# Patient Record
Sex: FEMALE | Race: WHITE | NOT HISPANIC OR LATINO | Employment: UNEMPLOYED | URBAN - NONMETROPOLITAN AREA
[De-identification: names, ages, dates, MRNs, and addresses within clinical notes are randomized per-mention and may not be internally consistent; named-entity substitution may affect disease eponyms.]

---

## 2020-10-12 ENCOUNTER — HOSPITAL ENCOUNTER (EMERGENCY)
Facility: HOSPITAL | Age: 80
Discharge: HOME | End: 2020-10-13
Attending: FAMILY MEDICINE | Admitting: FAMILY MEDICINE

## 2020-10-12 ENCOUNTER — APPOINTMENT (OUTPATIENT)
Dept: GENERAL RADIOLOGY | Facility: HOSPITAL | Age: 80
End: 2020-10-12

## 2020-10-12 DIAGNOSIS — N30.90 CYSTITIS: Primary | ICD-10-CM

## 2020-10-12 DIAGNOSIS — E83.42 HYPOMAGNESEMIA: ICD-10-CM

## 2020-10-12 LAB
ALBUMIN SERPL-MCNC: 4.26 G/DL (ref 3.5–5.2)
ALBUMIN/GLOB SERPL: 1.6 G/DL
ALP SERPL-CCNC: 92 U/L (ref 39–117)
ALT SERPL W P-5'-P-CCNC: 13 U/L (ref 1–33)
ANION GAP SERPL CALCULATED.3IONS-SCNC: 11.9 MMOL/L (ref 5–15)
AST SERPL-CCNC: 15 U/L (ref 1–32)
BACTERIA UR QL AUTO: ABNORMAL /HPF
BASOPHILS # BLD AUTO: 0.06 10*3/MM3 (ref 0–0.2)
BASOPHILS NFR BLD AUTO: 0.6 % (ref 0–1.5)
BILIRUB SERPL-MCNC: 0.3 MG/DL (ref 0–1.2)
BILIRUB UR QL STRIP: NEGATIVE
BUN SERPL-MCNC: 27 MG/DL (ref 8–23)
BUN/CREAT SERPL: 19.4 (ref 7–25)
CALCIUM SPEC-SCNC: 9.9 MG/DL (ref 8.6–10.5)
CHLORIDE SERPL-SCNC: 106 MMOL/L (ref 98–107)
CLARITY UR: CLEAR
CO2 SERPL-SCNC: 22.1 MMOL/L (ref 22–29)
COLOR UR: YELLOW
CREAT SERPL-MCNC: 1.39 MG/DL (ref 0.57–1)
CRP SERPL-MCNC: 0.09 MG/DL (ref 0–0.5)
D-LACTATE SERPL-SCNC: 1.3 MMOL/L (ref 0.5–2)
DEPRECATED RDW RBC AUTO: 43.4 FL (ref 37–54)
EOSINOPHIL # BLD AUTO: 0.08 10*3/MM3 (ref 0–0.4)
EOSINOPHIL NFR BLD AUTO: 0.8 % (ref 0.3–6.2)
ERYTHROCYTE [DISTWIDTH] IN BLOOD BY AUTOMATED COUNT: 14.1 % (ref 12.3–15.4)
GFR SERPL CREATININE-BSD FRML MDRD: 36 ML/MIN/1.73
GLOBULIN UR ELPH-MCNC: 2.7 GM/DL
GLUCOSE SERPL-MCNC: 276 MG/DL (ref 65–99)
GLUCOSE UR STRIP-MCNC: NEGATIVE MG/DL
HCT VFR BLD AUTO: 43.7 % (ref 34–46.6)
HGB BLD-MCNC: 14 G/DL (ref 12–15.9)
HGB UR QL STRIP.AUTO: NEGATIVE
HOLD SPECIMEN: NORMAL
HOLD SPECIMEN: NORMAL
HYALINE CASTS UR QL AUTO: ABNORMAL /LPF
IMM GRANULOCYTES # BLD AUTO: 0.03 10*3/MM3 (ref 0–0.05)
IMM GRANULOCYTES NFR BLD AUTO: 0.3 % (ref 0–0.5)
KETONES UR QL STRIP: NEGATIVE
LEUKOCYTE ESTERASE UR QL STRIP.AUTO: ABNORMAL
LYMPHOCYTES # BLD AUTO: 2.36 10*3/MM3 (ref 0.7–3.1)
LYMPHOCYTES NFR BLD AUTO: 24 % (ref 19.6–45.3)
MAGNESIUM SERPL-MCNC: 1.4 MG/DL (ref 1.6–2.4)
MCH RBC QN AUTO: 27.1 PG (ref 26.6–33)
MCHC RBC AUTO-ENTMCNC: 32 G/DL (ref 31.5–35.7)
MCV RBC AUTO: 84.5 FL (ref 79–97)
MONOCYTES # BLD AUTO: 0.93 10*3/MM3 (ref 0.1–0.9)
MONOCYTES NFR BLD AUTO: 9.5 % (ref 5–12)
NEUTROPHILS NFR BLD AUTO: 6.37 10*3/MM3 (ref 1.7–7)
NEUTROPHILS NFR BLD AUTO: 64.8 % (ref 42.7–76)
NITRITE UR QL STRIP: NEGATIVE
NRBC BLD AUTO-RTO: 0 /100 WBC (ref 0–0.2)
NT-PROBNP SERPL-MCNC: 3488 PG/ML (ref 0–1800)
PH UR STRIP.AUTO: 5.5 [PH] (ref 5–8)
PLATELET # BLD AUTO: 271 10*3/MM3 (ref 140–450)
PMV BLD AUTO: 10.8 FL (ref 6–12)
POTASSIUM SERPL-SCNC: 4.6 MMOL/L (ref 3.5–5.2)
PROT SERPL-MCNC: 7 G/DL (ref 6–8.5)
PROT UR QL STRIP: NEGATIVE
RBC # BLD AUTO: 5.17 10*6/MM3 (ref 3.77–5.28)
RBC # UR: ABNORMAL /HPF
REF LAB TEST METHOD: ABNORMAL
SODIUM SERPL-SCNC: 140 MMOL/L (ref 136–145)
SP GR UR STRIP: 1.02 (ref 1–1.03)
SQUAMOUS #/AREA URNS HPF: ABNORMAL /HPF
TROPONIN T SERPL-MCNC: <0.01 NG/ML (ref 0–0.03)
TROPONIN T SERPL-MCNC: <0.01 NG/ML (ref 0–0.03)
TSH SERPL DL<=0.05 MIU/L-ACNC: 2.02 UIU/ML (ref 0.27–4.2)
UROBILINOGEN UR QL STRIP: ABNORMAL
WBC # BLD AUTO: 9.83 10*3/MM3 (ref 3.4–10.8)
WBC UR QL AUTO: ABNORMAL /HPF
WHOLE BLOOD HOLD SPECIMEN: NORMAL
WHOLE BLOOD HOLD SPECIMEN: NORMAL

## 2020-10-12 PROCEDURE — 86140 C-REACTIVE PROTEIN: CPT | Performed by: FAMILY MEDICINE

## 2020-10-12 PROCEDURE — 71045 X-RAY EXAM CHEST 1 VIEW: CPT

## 2020-10-12 PROCEDURE — 80053 COMPREHEN METABOLIC PANEL: CPT | Performed by: PHYSICIAN ASSISTANT

## 2020-10-12 PROCEDURE — 87086 URINE CULTURE/COLONY COUNT: CPT | Performed by: FAMILY MEDICINE

## 2020-10-12 PROCEDURE — 96365 THER/PROPH/DIAG IV INF INIT: CPT

## 2020-10-12 PROCEDURE — 85025 COMPLETE CBC W/AUTO DIFF WBC: CPT | Performed by: PHYSICIAN ASSISTANT

## 2020-10-12 PROCEDURE — 84443 ASSAY THYROID STIM HORMONE: CPT | Performed by: FAMILY MEDICINE

## 2020-10-12 PROCEDURE — 99285 EMERGENCY DEPT VISIT HI MDM: CPT

## 2020-10-12 PROCEDURE — 83735 ASSAY OF MAGNESIUM: CPT | Performed by: FAMILY MEDICINE

## 2020-10-12 PROCEDURE — 83880 ASSAY OF NATRIURETIC PEPTIDE: CPT | Performed by: PHYSICIAN ASSISTANT

## 2020-10-12 PROCEDURE — 25010000002 MAGNESIUM SULFATE 2 GM/50ML SOLUTION: Performed by: FAMILY MEDICINE

## 2020-10-12 PROCEDURE — 96366 THER/PROPH/DIAG IV INF ADDON: CPT

## 2020-10-12 PROCEDURE — 96375 TX/PRO/DX INJ NEW DRUG ADDON: CPT

## 2020-10-12 PROCEDURE — 93010 ELECTROCARDIOGRAM REPORT: CPT | Performed by: INTERNAL MEDICINE

## 2020-10-12 PROCEDURE — 81001 URINALYSIS AUTO W/SCOPE: CPT | Performed by: PHYSICIAN ASSISTANT

## 2020-10-12 PROCEDURE — 83605 ASSAY OF LACTIC ACID: CPT | Performed by: FAMILY MEDICINE

## 2020-10-12 PROCEDURE — 84484 ASSAY OF TROPONIN QUANT: CPT | Performed by: PHYSICIAN ASSISTANT

## 2020-10-12 PROCEDURE — 36415 COLL VENOUS BLD VENIPUNCTURE: CPT

## 2020-10-12 PROCEDURE — 93005 ELECTROCARDIOGRAM TRACING: CPT | Performed by: PHYSICIAN ASSISTANT

## 2020-10-12 PROCEDURE — 71045 X-RAY EXAM CHEST 1 VIEW: CPT | Performed by: RADIOLOGY

## 2020-10-12 PROCEDURE — 87040 BLOOD CULTURE FOR BACTERIA: CPT | Performed by: FAMILY MEDICINE

## 2020-10-12 RX ORDER — ASPIRIN 325 MG
325 TABLET ORAL ONCE
Status: COMPLETED | OUTPATIENT
Start: 2020-10-12 | End: 2020-10-12

## 2020-10-12 RX ORDER — DILTIAZEM HYDROCHLORIDE 5 MG/ML
5 INJECTION INTRAVENOUS ONCE
Status: COMPLETED | OUTPATIENT
Start: 2020-10-12 | End: 2020-10-12

## 2020-10-12 RX ORDER — MAGNESIUM SULFATE HEPTAHYDRATE 40 MG/ML
2 INJECTION, SOLUTION INTRAVENOUS ONCE
Status: COMPLETED | OUTPATIENT
Start: 2020-10-12 | End: 2020-10-13

## 2020-10-12 RX ADMIN — MAGNESIUM SULFATE IN WATER 2 G: 40 INJECTION, SOLUTION INTRAVENOUS at 22:14

## 2020-10-12 RX ADMIN — DILTIAZEM HYDROCHLORIDE 5 MG: 5 INJECTION INTRAVENOUS at 19:47

## 2020-10-12 RX ADMIN — ASPIRIN 325 MG: 325 TABLET ORAL at 21:30

## 2020-10-12 NOTE — ED PROVIDER NOTES
"Subjective   Patient is a 89-year-old female presents emergency department complaining of generalized weakness, heart palpitations and shortness of breath that has been going on for the last 3 days.  The patient has a history of atrial fibrillation and is on anticoagulation for this.  She went to her PCPs office today with the symptoms and was found to be in A. fib with RVR to 130 bpm.  The patient has not had any fever or chills.  She has mentioned that when she does any kind of physical activity over the last few days that she starts \"pouring sweat \".  The patient has also had some on and off chest pain that she describes as aching.  She states that her PCP gave her an oral antiarrhythmic agent prior to her coming to the ER.  The patient states that she has not been around anyone with coronavirus to her knowledge.  She states that she had one episode of vomiting earlier today.  She also mentions that she has a history of congestive heart failure which usually gets worse when her heart rate goes up.  The patient denies any additional symptoms at this time          Review of Systems   Constitutional: Positive for activity change and fatigue. Negative for appetite change, chills, diaphoresis and fever.   HENT: Negative for congestion, postnasal drip, rhinorrhea, sinus pressure, sinus pain, sneezing and sore throat.    Respiratory: Positive for shortness of breath. Negative for apnea, cough, choking, chest tightness and wheezing.    Cardiovascular: Positive for chest pain, palpitations and leg swelling.   Gastrointestinal: Positive for nausea and vomiting. Negative for abdominal distention, abdominal pain, constipation, diarrhea and rectal pain.   Genitourinary: Negative for difficulty urinating.   Musculoskeletal: Negative for arthralgias and gait problem.   Neurological: Negative for dizziness and light-headedness.   Psychiatric/Behavioral: Negative for agitation and confusion.       No past medical history on " file.    Allergies   Allergen Reactions   • Amoxicillin GI Intolerance   • Hydroxychloroquine Sulfate GI Intolerance   • Lactulose GI Intolerance   • Nsaids GI Intolerance   • Augmentin [Amoxicillin-Pot Clavulanate] Rash   • Penicillins Rash   • Potassium Clavulanate [Clavulanic Acid] Rash       No past surgical history on file.    No family history on file.    Social History     Socioeconomic History   • Marital status:      Spouse name: Not on file   • Number of children: Not on file   • Years of education: Not on file   • Highest education level: Not on file           Objective   Physical Exam  Vitals signs and nursing note reviewed.   Constitutional:       General: She is not in acute distress.     Appearance: She is well-developed and normal weight. She is not ill-appearing, toxic-appearing or diaphoretic.   HENT:      Head: Normocephalic.   Neck:      Musculoskeletal: Normal range of motion and neck supple.      Thyroid: No thyromegaly.      Vascular: No hepatojugular reflux or JVD.      Trachea: No tracheal deviation.   Cardiovascular:      Rate and Rhythm: Tachycardia present. Rhythm irregular.  No extrasystoles are present.     Chest Wall: PMI is not displaced.      Heart sounds: Heart sounds not distant. No murmur. No systolic murmur. No friction rub. No gallop. No S3 or S4 sounds.    Pulmonary:      Effort: Pulmonary effort is normal. No tachypnea, accessory muscle usage or respiratory distress.      Breath sounds: No stridor. Examination of the right-middle field reveals rhonchi. Examination of the left-middle field reveals rhonchi. Examination of the right-lower field reveals rhonchi. Examination of the left-lower field reveals rhonchi. Rhonchi present.   Chest:      Chest wall: No mass, deformity, tenderness or crepitus.   Abdominal:      General: There is no abdominal bruit.      Palpations: Abdomen is soft. There is no fluid wave, hepatomegaly or splenomegaly.   Musculoskeletal:      Right  lower leg: Edema present.      Left lower leg: Edema present.   Lymphadenopathy:      Cervical: No cervical adenopathy.   Skin:     General: Skin is warm and dry.      Capillary Refill: Capillary refill takes less than 2 seconds.      Coloration: Skin is not cyanotic or pale.      Findings: No ecchymosis or erythema.   Neurological:      General: No focal deficit present.      Mental Status: She is alert.      Cranial Nerves: No cranial nerve deficit.      Motor: No weakness.   Psychiatric:         Mood and Affect: Mood normal. Mood is not anxious.         Behavior: Behavior normal.         Procedures           ED Course                                           MDM  Number of Diagnoses or Management Options  Cystitis: new and requires workup  Hypomagnesemia: new and requires workup     Amount and/or Complexity of Data Reviewed  Clinical lab tests: ordered and reviewed  Tests in the radiology section of CPT®: reviewed and ordered  Tests in the medicine section of CPT®: reviewed and ordered  Independent visualization of images, tracings, or specimens: yes    Risk of Complications, Morbidity, and/or Mortality  Presenting problems: high  Diagnostic procedures: moderate  Management options: moderate        Final diagnoses:   Cystitis   Hypomagnesemia            Rajani Cruz,   10/13/20 0117       Rajani Cruz,   10/13/20 0118

## 2020-10-12 NOTE — ED PROVIDER NOTES
81 y/o female pt presents to the ED with palpitations and CP that started today.  Patient states that it is worsened with standing up.  Patient was seen at PCP office today and had AFIB on EKG so was sent to the ED.  Patient has rapid HR.  Normal breath sounds.      Kinga Fair PA  10/12/20 1802

## 2020-10-13 VITALS
BODY MASS INDEX: 29.49 KG/M2 | SYSTOLIC BLOOD PRESSURE: 122 MMHG | HEIGHT: 65 IN | WEIGHT: 177 LBS | OXYGEN SATURATION: 95 % | TEMPERATURE: 98.4 F | RESPIRATION RATE: 18 BRPM | DIASTOLIC BLOOD PRESSURE: 78 MMHG | HEART RATE: 84 BPM

## 2020-10-13 RX ORDER — CEPHALEXIN 500 MG/1
500 CAPSULE ORAL 2 TIMES DAILY
Qty: 20 CAPSULE | Refills: 0 | Status: SHIPPED | OUTPATIENT
Start: 2020-10-13

## 2020-10-13 RX ORDER — MAGNESIUM OXIDE 400 MG/1
400 TABLET ORAL DAILY
Qty: 14 TABLET | Refills: 0 | Status: SHIPPED | OUTPATIENT
Start: 2020-10-13

## 2020-10-13 NOTE — ED NOTES
Provider at bedside at this time updating pt and daughter on results and plan to discharge home, all questions and concerns addressed, understanding verbalized.     Elena Contreras RN  10/13/20 0116

## 2020-10-13 NOTE — ED NOTES
Provider at bedside at this time updating pt and family on POC, all questions and concerns addressed, understanding verbalized.     Elena Contreras RN  10/13/20 0013

## 2020-10-15 LAB — BACTERIA SPEC AEROBE CULT: NORMAL

## 2020-10-17 LAB
BACTERIA SPEC AEROBE CULT: NORMAL
BACTERIA SPEC AEROBE CULT: NORMAL

## 2021-04-12 ENCOUNTER — TRANSCRIBE ORDERS (OUTPATIENT)
Dept: ADMINISTRATIVE | Facility: HOSPITAL | Age: 81
End: 2021-04-12

## 2021-04-12 DIAGNOSIS — R07.9 CHEST PAIN, UNSPECIFIED TYPE: Primary | ICD-10-CM

## 2021-05-13 ENCOUNTER — HOSPITAL ENCOUNTER (OUTPATIENT)
Dept: NUCLEAR MEDICINE | Facility: HOSPITAL | Age: 81
Discharge: HOME | End: 2021-05-13

## 2021-05-13 ENCOUNTER — HOSPITAL ENCOUNTER (OUTPATIENT)
Dept: CARDIOLOGY | Facility: HOSPITAL | Age: 81
Discharge: HOME | End: 2021-05-13

## 2021-05-13 DIAGNOSIS — R07.9 CHEST PAIN, UNSPECIFIED TYPE: ICD-10-CM

## 2021-05-13 LAB
BH CV ECHO MEAS - % IVS THICK: 6.2 %
BH CV ECHO MEAS - % LVPW THICK: 3.8 %
BH CV ECHO MEAS - ACS: 1.8 CM
BH CV ECHO MEAS - AI DEC SLOPE: 225 CM/SEC^2
BH CV ECHO MEAS - AI MAX PG: 48.7 MMHG
BH CV ECHO MEAS - AI MAX VEL: 349 CM/SEC
BH CV ECHO MEAS - AI P1/2T: 454.3 MSEC
BH CV ECHO MEAS - AO MAX PG: 8.4 MMHG
BH CV ECHO MEAS - AO MEAN PG: 4 MMHG
BH CV ECHO MEAS - AO ROOT AREA (BSA CORRECTED): 1.4
BH CV ECHO MEAS - AO ROOT AREA: 5.7 CM^2
BH CV ECHO MEAS - AO ROOT DIAM: 2.7 CM
BH CV ECHO MEAS - AO V2 MAX: 145 CM/SEC
BH CV ECHO MEAS - AO V2 MEAN: 97.6 CM/SEC
BH CV ECHO MEAS - AO V2 VTI: 33.9 CM
BH CV ECHO MEAS - BSA(HAYCOCK): 1.9 M^2
BH CV ECHO MEAS - BSA: 1.9 M^2
BH CV ECHO MEAS - BZI_BMI: 29.5 KILOGRAMS/M^2
BH CV ECHO MEAS - BZI_METRIC_HEIGHT: 165.1 CM
BH CV ECHO MEAS - BZI_METRIC_WEIGHT: 80.3 KG
BH CV ECHO MEAS - EDV(CUBED): 107.2 ML
BH CV ECHO MEAS - EDV(MOD-SP4): 117 ML
BH CV ECHO MEAS - EDV(TEICH): 104.9 ML
BH CV ECHO MEAS - EF(CUBED): 62.2 %
BH CV ECHO MEAS - EF(MOD-SP4): 62.6 %
BH CV ECHO MEAS - EF(TEICH): 53.7 %
BH CV ECHO MEAS - ESV(CUBED): 40.5 ML
BH CV ECHO MEAS - ESV(MOD-SP4): 43.7 ML
BH CV ECHO MEAS - ESV(TEICH): 48.6 ML
BH CV ECHO MEAS - FS: 27.7 %
BH CV ECHO MEAS - IVS/LVPW: 0.95
BH CV ECHO MEAS - IVSD: 1.1 CM
BH CV ECHO MEAS - IVSS: 1.2 CM
BH CV ECHO MEAS - LA DIMENSION: 3.1 CM
BH CV ECHO MEAS - LA/AO: 1.1
BH CV ECHO MEAS - LV DIASTOLIC VOL/BSA (35-75): 62.3 ML/M^2
BH CV ECHO MEAS - LV MASS(C)D: 203.6 GRAMS
BH CV ECHO MEAS - LV MASS(C)DI: 108.4 GRAMS/M^2
BH CV ECHO MEAS - LV MASS(C)S: 133.9 GRAMS
BH CV ECHO MEAS - LV MASS(C)SI: 71.3 GRAMS/M^2
BH CV ECHO MEAS - LV SYSTOLIC VOL/BSA (12-30): 23.3 ML/M^2
BH CV ECHO MEAS - LVIDD: 4.8 CM
BH CV ECHO MEAS - LVIDS: 3.4 CM
BH CV ECHO MEAS - LVLD AP4: 7.2 CM
BH CV ECHO MEAS - LVLS AP4: 5.7 CM
BH CV ECHO MEAS - LVOT AREA (M): 3.1 CM^2
BH CV ECHO MEAS - LVOT AREA: 3.1 CM^2
BH CV ECHO MEAS - LVOT DIAM: 2 CM
BH CV ECHO MEAS - LVPWD: 1.2 CM
BH CV ECHO MEAS - LVPWS: 1.2 CM
BH CV ECHO MEAS - MV A MAX VEL: 98.5 CM/SEC
BH CV ECHO MEAS - MV E MAX VEL: 75 CM/SEC
BH CV ECHO MEAS - MV E/A: 0.76
BH CV ECHO MEAS - PA ACC TIME: 0.12 SEC
BH CV ECHO MEAS - PA PR(ACCEL): 23.7 MMHG
BH CV ECHO MEAS - RAP SYSTOLE: 10 MMHG
BH CV ECHO MEAS - RVSP: 36.8 MMHG
BH CV ECHO MEAS - SI(AO): 103.4 ML/M^2
BH CV ECHO MEAS - SI(CUBED): 35.5 ML/M^2
BH CV ECHO MEAS - SI(MOD-SP4): 39 ML/M^2
BH CV ECHO MEAS - SI(TEICH): 30 ML/M^2
BH CV ECHO MEAS - SV(AO): 194.1 ML
BH CV ECHO MEAS - SV(CUBED): 66.6 ML
BH CV ECHO MEAS - SV(MOD-SP4): 73.3 ML
BH CV ECHO MEAS - SV(TEICH): 56.3 ML
BH CV ECHO MEAS - TR MAX VEL: 259 CM/SEC
MAXIMAL PREDICTED HEART RATE: 139 BPM
STRESS TARGET HR: 118 BPM

## 2021-05-13 PROCEDURE — 25010000002 REGADENOSON 0.4 MG/5ML SOLUTION: Performed by: INTERNAL MEDICINE

## 2021-05-13 PROCEDURE — 0 TECHNETIUM SESTAMIBI: Performed by: INTERNAL MEDICINE

## 2021-05-13 PROCEDURE — A9500 TC99M SESTAMIBI: HCPCS | Performed by: INTERNAL MEDICINE

## 2021-05-13 PROCEDURE — 93017 CV STRESS TEST TRACING ONLY: CPT

## 2021-05-13 PROCEDURE — 78452 HT MUSCLE IMAGE SPECT MULT: CPT

## 2021-05-13 PROCEDURE — 93306 TTE W/DOPPLER COMPLETE: CPT

## 2021-05-13 RX ORDER — POLYETHYLENE GLYCOL 3350
POWDER (GRAM) MISCELLANEOUS
COMMUNITY

## 2021-05-13 RX ORDER — PROMETHAZINE HYDROCHLORIDE 25 MG/1
25 TABLET ORAL EVERY 6 HOURS PRN
COMMUNITY

## 2021-05-13 RX ORDER — DULOXETIN HYDROCHLORIDE 60 MG/1
60 CAPSULE, DELAYED RELEASE ORAL DAILY
COMMUNITY

## 2021-05-13 RX ORDER — GLIPIZIDE 5 MG/1
5 TABLET ORAL
COMMUNITY

## 2021-05-13 RX ORDER — GENTAMICIN SULFATE 3 MG/ML
1 SOLUTION/ DROPS OPHTHALMIC EVERY 4 HOURS
COMMUNITY

## 2021-05-13 RX ORDER — AMLODIPINE BESYLATE 10 MG/1
10 TABLET ORAL DAILY
COMMUNITY

## 2021-05-13 RX ORDER — ROSUVASTATIN CALCIUM 10 MG/1
10 TABLET, COATED ORAL DAILY
COMMUNITY

## 2021-05-13 RX ORDER — TORSEMIDE 20 MG/1
20 TABLET ORAL DAILY
COMMUNITY

## 2021-05-13 RX ORDER — ASCORBIC ACID 500 MG
500 TABLET ORAL DAILY
COMMUNITY

## 2021-05-13 RX ORDER — CIPROFLOXACIN 250 MG/1
250 TABLET, FILM COATED ORAL 2 TIMES DAILY
COMMUNITY

## 2021-05-13 RX ORDER — MECLIZINE HCL 25MG 25 MG/1
25 TABLET, CHEWABLE ORAL 3 TIMES DAILY PRN
COMMUNITY

## 2021-05-13 RX ORDER — ALBUTEROL SULFATE 2.5 MG/3ML
2.5 SOLUTION RESPIRATORY (INHALATION) EVERY 4 HOURS PRN
COMMUNITY

## 2021-05-13 RX ORDER — POTASSIUM CHLORIDE 20 MEQ/1
20 TABLET, EXTENDED RELEASE ORAL 2 TIMES DAILY
COMMUNITY

## 2021-05-13 RX ORDER — PANTOPRAZOLE SODIUM 40 MG/1
40 TABLET, DELAYED RELEASE ORAL DAILY
COMMUNITY

## 2021-05-13 RX ORDER — HYDROXYZINE PAMOATE 25 MG/1
25 CAPSULE ORAL 3 TIMES DAILY PRN
COMMUNITY

## 2021-05-13 RX ORDER — LANOLIN ALCOHOL/MO/W.PET/CERES
1000 CREAM (GRAM) TOPICAL DAILY
COMMUNITY

## 2021-05-13 RX ORDER — TRIAMCINOLONE ACETONIDE 0.25 MG/G
CREAM TOPICAL 2 TIMES DAILY
COMMUNITY

## 2021-05-13 RX ORDER — TRAZODONE HYDROCHLORIDE 50 MG/1
50 TABLET ORAL NIGHTLY
COMMUNITY

## 2021-05-13 RX ORDER — SENNA PLUS 8.6 MG/1
1 TABLET ORAL DAILY
COMMUNITY

## 2021-05-13 RX ORDER — LOSARTAN POTASSIUM 50 MG/1
100 TABLET ORAL DAILY
COMMUNITY

## 2021-05-13 RX ORDER — MELATONIN
1000 DAILY
COMMUNITY

## 2021-05-13 RX ORDER — CARVEDILOL 25 MG/1
25 TABLET ORAL 2 TIMES DAILY WITH MEALS
COMMUNITY

## 2021-05-13 RX ADMIN — TECHNETIUM TC 99M SESTAMIBI 1 DOSE: 1 INJECTION INTRAVENOUS at 13:55

## 2021-05-13 RX ADMIN — REGADENOSON 0.4 MG: 0.08 INJECTION, SOLUTION INTRAVENOUS at 13:55

## 2021-05-13 RX ADMIN — TECHNETIUM TC 99M SESTAMIBI 1 DOSE: 1 INJECTION INTRAVENOUS at 12:43

## 2021-05-17 LAB
BH CV NUCLEAR PRIOR STUDY: 3
BH CV REST NUCLEAR ISOTOPE DOSE: 9.5 MCI
BH CV STRESS BP STAGE 1: NORMAL
BH CV STRESS BP STAGE 2: NORMAL
BH CV STRESS COMMENTS STAGE 1: NORMAL
BH CV STRESS COMMENTS STAGE 2: NORMAL
BH CV STRESS DOSE REGADENOSON STAGE 1: 0.4
BH CV STRESS DURATION MIN STAGE 1: 0
BH CV STRESS DURATION MIN STAGE 2: 4
BH CV STRESS DURATION SEC STAGE 1: 10
BH CV STRESS DURATION SEC STAGE 2: 0
BH CV STRESS HR STAGE 1: 58
BH CV STRESS HR STAGE 2: 62
BH CV STRESS NUCLEAR ISOTOPE DOSE: 28.9 MCI
BH CV STRESS PROTOCOL 1: NORMAL
BH CV STRESS RECOVERY BP: NORMAL MMHG
BH CV STRESS RECOVERY HR: 62 BPM
BH CV STRESS STAGE 1: 1
BH CV STRESS STAGE 2: 2
LV EF NUC BP: 46 %
MAXIMAL PREDICTED HEART RATE: 139 BPM
PERCENT MAX PREDICTED HR: 48.2 %
STRESS BASELINE BP: NORMAL MMHG
STRESS BASELINE HR: 61 BPM
STRESS PERCENT HR: 57 %
STRESS POST PEAK BP: NORMAL MMHG
STRESS POST PEAK HR: 67 BPM
STRESS TARGET HR: 118 BPM

## 2024-01-25 ENCOUNTER — OFFICE VISIT (OUTPATIENT)
Dept: INFECTIOUS DISEASES | Facility: CLINIC | Age: 84
End: 2024-01-25
Payer: MEDICARE

## 2024-01-25 VITALS
HEIGHT: 65 IN | OXYGEN SATURATION: 98 % | WEIGHT: 178.4 LBS | HEART RATE: 69 BPM | DIASTOLIC BLOOD PRESSURE: 62 MMHG | SYSTOLIC BLOOD PRESSURE: 130 MMHG | BODY MASS INDEX: 29.72 KG/M2

## 2024-01-25 DIAGNOSIS — R33.9 URINARY RETENTION: ICD-10-CM

## 2024-01-25 DIAGNOSIS — Z16.12 EXTENDED SPECTRUM BETA LACTAMASE (ESBL) RESISTANCE: Primary | ICD-10-CM

## 2024-01-25 DIAGNOSIS — N39.0 RECURRENT UTI: ICD-10-CM

## 2024-01-25 LAB
BACTERIA UR QL AUTO: ABNORMAL /HPF
BILIRUB UR QL STRIP: NEGATIVE
CLARITY UR: ABNORMAL
COLOR UR: YELLOW
GLUCOSE UR STRIP-MCNC: NEGATIVE MG/DL
HGB UR QL STRIP.AUTO: ABNORMAL
HYALINE CASTS UR QL AUTO: ABNORMAL /LPF
KETONES UR QL STRIP: NEGATIVE
LEUKOCYTE ESTERASE UR QL STRIP.AUTO: ABNORMAL
NITRITE UR QL STRIP: NEGATIVE
PH UR STRIP.AUTO: 6 [PH] (ref 5–8)
PROT UR QL STRIP: ABNORMAL
RBC # UR STRIP: ABNORMAL /HPF
REF LAB TEST METHOD: ABNORMAL
SP GR UR STRIP: 1.01 (ref 1–1.03)
SQUAMOUS #/AREA URNS HPF: ABNORMAL /HPF
UROBILINOGEN UR QL STRIP: ABNORMAL
WBC # UR STRIP: ABNORMAL /HPF

## 2024-01-25 PROCEDURE — 87088 URINE BACTERIA CULTURE: CPT | Performed by: INTERNAL MEDICINE

## 2024-01-25 PROCEDURE — 99203 OFFICE O/P NEW LOW 30 MIN: CPT | Performed by: INTERNAL MEDICINE

## 2024-01-25 PROCEDURE — 87186 SC STD MICRODIL/AGAR DIL: CPT | Performed by: INTERNAL MEDICINE

## 2024-01-25 PROCEDURE — 81001 URINALYSIS AUTO W/SCOPE: CPT | Performed by: INTERNAL MEDICINE

## 2024-01-25 PROCEDURE — 87086 URINE CULTURE/COLONY COUNT: CPT | Performed by: INTERNAL MEDICINE

## 2024-01-25 NOTE — PROGRESS NOTES
[facility]         Referring Provider: Christian Mckeon DO  [address] [place] [address]    Subjective      Chief Complaint  Initial Evaluation (Recurrent ESBL UTI)    History of Present Illness  Maggie Perez is a 83 y.o. female who presents today to [facility] for Initial Consultation  . Past medical history is significant for recurrent UTI with ESBL Klebsiella most recently treated with oral Cipro with no improvement. No fever, and no chills. Has seen urology and follows up with Dr. Xie.    Past Medical History:   Diagnosis Date    Asthma     CHF (congestive heart failure)     Coronary artery disease        Past Surgical History:   Procedure Laterality Date    PERIPHERAL ARTERIAL STENT GRAFT         Social History     Socioeconomic History    Marital status:    Tobacco Use    Smoking status: Never       Family History  family history is not on file.    Immunization History   Administered Date(s) Administered    COVID-19 (PFIZER) Purple Cap Monovalent 09/09/2021        Allergies  Allergies   Allergen Reactions    Amoxicillin GI Intolerance    Hydroxychloroquine Sulfate GI Intolerance    Lactulose GI Intolerance    Nsaids GI Intolerance    Augmentin [Amoxicillin-Pot Clavulanate] Rash    Penicillins Rash    Potassium Clavulanate [Clavulanic Acid] Rash       The medication list has been reviewed and updated.   Current Medications    Current Outpatient Medications:     albuterol (PROVENTIL) (2.5 MG/3ML) 0.083% nebulizer solution, Take 2.5 mg by nebulization Every 4 (Four) Hours As Needed for Wheezing., Disp: , Rfl:     amLODIPine (NORVASC) 10 MG tablet, Take 10 mg by mouth Daily., Disp: , Rfl:     apixaban (ELIQUIS) 2.5 MG tablet tablet, Take 2.5 mg by mouth 2 (Two) Times a Day., Disp: , Rfl:     ascorbic acid (VITAMIN C) 500 MG tablet, Take 500 mg by mouth Daily., Disp: , Rfl:     carvedilol (COREG) 25 MG tablet, Take 25 mg by mouth  2 (Two) Times a Day With Meals., Disp: , Rfl:     cephalexin (KEFLEX) 500 MG capsule, Take 1 capsule by mouth 2 (Two) Times a Day., Disp: 20 capsule, Rfl: 0    cholecalciferol (VITAMIN D3) 25 MCG (1000 UT) tablet, Take 1,000 Units by mouth Daily., Disp: , Rfl:     ciprofloxacin (CIPRO) 250 MG tablet, Take 250 mg by mouth 2 (Two) Times a Day., Disp: , Rfl:     Diclofenac Sodium (VOLTAREN) 1 % gel gel, Apply 4 g topically to the appropriate area as directed 4 (Four) Times a Day As Needed., Disp: , Rfl:     DULoxetine (CYMBALTA) 60 MG capsule, Take 60 mg by mouth Daily., Disp: , Rfl:     gentamicin (GARAMYCIN) 0.3 % ophthalmic solution, 1 drop Every 4 (Four) Hours., Disp: , Rfl:     glipizide (GLUCOTROL) 5 MG tablet, Take 5 mg by mouth 2 (Two) Times a Day Before Meals., Disp: , Rfl:     hydrOXYzine pamoate (VISTARIL) 25 MG capsule, Take 25 mg by mouth 3 (Three) Times a Day As Needed for Itching., Disp: , Rfl:     insulin lispro protamine-insulin lispro (humaLOG 50-50) (50-50) 100 UNIT/ML suspension injection, Inject  under the skin into the appropriate area as directed 2 (Two) Times a Day With Meals., Disp: , Rfl:     losartan (COZAAR) 50 MG tablet, Take 100 mg by mouth Daily., Disp: , Rfl:     magnesium oxide (MAG-OX) 400 MG tablet, Take 1 tablet by mouth Daily., Disp: 14 tablet, Rfl: 0    meclizine 25 MG chewable tablet chewable tablet, Chew 25 mg 3 (Three) Times a Day As Needed., Disp: , Rfl:     pantoprazole (PROTONIX) 40 MG EC tablet, Take 40 mg by mouth Daily., Disp: , Rfl:     Polyethylene Glycol 3350 powder, , Disp: , Rfl:     potassium chloride (K-DUR,KLOR-CON) 20 MEQ CR tablet, Take 20 mEq by mouth 2 (Two) Times a Day., Disp: , Rfl:     promethazine (PHENERGAN) 25 MG tablet, Take 25 mg by mouth Every 6 (Six) Hours As Needed for Nausea or Vomiting., Disp: , Rfl:     rosuvastatin (CRESTOR) 10 MG tablet, Take 10 mg by mouth Daily., Disp: , Rfl:     senna (senna) 8.6 MG tablet, Take 1 tablet by mouth Daily.,  "Disp: , Rfl:     torsemide (DEMADEX) 20 MG tablet, Take 20 mg by mouth Daily., Disp: , Rfl:     traZODone (DESYREL) 50 MG tablet, Take 50 mg by mouth Every Night., Disp: , Rfl:     triamcinolone (KENALOG) 0.025 % cream, Apply  topically to the appropriate area as directed 2 (Two) Times a Day., Disp: , Rfl:     vitamin B-12 (CYANOCOBALAMIN) 1000 MCG tablet, Take 1,000 mcg by mouth Daily., Disp: , Rfl:       Review of Systems    Review of Systems   Constitutional:  Negative for activity change, appetite change, chills, diaphoresis, fatigue and fever.   HENT:  Negative for congestion, dental problem, drooling, ear discharge, ear pain, facial swelling, postnasal drip, sinus pressure, sore throat and swollen glands.    Eyes:  Negative for blurred vision, double vision, pain, discharge and itching.   Respiratory:  Negative for apnea, cough, choking, chest tightness and shortness of breath.    Cardiovascular:  Negative for chest pain, palpitations and leg swelling.   Gastrointestinal:  Negative for abdominal distention, abdominal pain, diarrhea, nausea, rectal pain and vomiting.   Genitourinary:  Positive for dysuria and frequency. Negative for difficulty urinating, flank pain, hematuria, pelvic pain and pelvic pressure.   Neurological:  Negative for dizziness, tremors, seizures, syncope, speech difficulty and confusion.   Psychiatric/Behavioral:  Negative for agitation and behavioral problems.         Objective     Vital Signs:  /62 (BP Location: Right arm, Patient Position: Sitting, Cuff Size: Small Adult)   Pulse 69   Ht 165.1 cm (65\")   Wt 80.9 kg (178 lb 6.4 oz)   SpO2 98%   BMI 29.69 kg/m²   Estimated body mass index is 29.69 kg/m² as calculated from the following:    Height as of this encounter: 165.1 cm (65\").    Weight as of this encounter: 80.9 kg (178 lb 6.4 oz).    Physical Exam  Constitutional:       General: She is not in acute distress.     Appearance: Normal appearance. She is not ill-appearing, " "toxic-appearing or diaphoretic.   HENT:      Head: Normocephalic and atraumatic.      Nose: No congestion or rhinorrhea.      Mouth/Throat:      Pharynx: Oropharynx is clear. No oropharyngeal exudate or posterior oropharyngeal erythema.   Cardiovascular:      Rate and Rhythm: Normal rate and regular rhythm.      Heart sounds: No murmur heard.  Pulmonary:      Effort: No respiratory distress.      Breath sounds: No stridor. No wheezing, rhonchi or rales.   Chest:      Chest wall: No tenderness.   Abdominal:      General: There is no distension.      Tenderness: There is no abdominal tenderness. There is no right CVA tenderness, left CVA tenderness, guarding or rebound.   Musculoskeletal:         General: No swelling, tenderness or signs of injury.      Cervical back: No rigidity or tenderness.   Skin:     Coloration: Skin is not jaundiced or pale.      Findings: No bruising, erythema or rash.   Neurological:      General: No focal deficit present.      Mental Status: She is alert. Mental status is at baseline.   Psychiatric:         Mood and Affect: Mood normal.         Behavior: Behavior normal.          Result Review :  The following data was reviewed by Kirstin Katz MD     Lab Results  Lab Results   Component Value Date    WBC 9.83 10/12/2020    HGB 14.0 10/12/2020    HCT 43.7 10/12/2020    MCV 84.5 10/12/2020     10/12/2020     Lab Results   Component Value Date    GLUCOSE 276 (H) 10/12/2020    BUN 27 (H) 10/12/2020    CREATININE 1.39 (H) 10/12/2020    EGFRIFNONA 36 (L) 10/12/2020    BCR 19.4 10/12/2020    K 4.6 10/12/2020    CO2 22.1 10/12/2020    CALCIUM 9.9 10/12/2020    ALBUMIN 4.26 10/12/2020    AST 15 10/12/2020    ALT 13 10/12/2020      Lab Results   Component Value Date    CRP 0.09 10/12/2020        No results found for: \"ACANTHNAEG\", \"AFBCX\", \"BPERTUSSISCX\", \"BLOODCX\"  No results found for: \"BCIDPCR\", \"CXREFLEX\", \"CSFCX\", \"CULTURETIS\"  No results found for: \"CULTURES\", \"HSVCX\", \"URCX\"  No " "results found for: \"EYECULTURE\", \"GCCX\", \"HSVCULTURE\", \"LABHSV\"  No results found for: \"LEGIONELLA\", \"MRSACX\", \"MUMPSCX\", \"MYCOPLASCX\"  No results found for: \"NOCARDIACX\", \"STOOLCX\"  No results found for: \"THROATCX\", \"UNSTIMCULT\", \"URINECX\", \"CULTURE\", \"VZVCULTUR\"  No results found for: \"VIRALCULTU\", \"WOUNDCX\"    Radiology Results              Assessment / Plan        Diagnoses and all orders for this visit:    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 Recurrent UTI  -     Urinalysis With Microscopic - Urine, Clean Catch; Future  -     Urine Culture - Urine, Urine, Catheter In/Out; Future    3. Urinary retention  -     Urinalysis With Microscopic - Urine, Clean Catch; Future  -     Urine Culture - Urine, Urine, Catheter In/Out; Future      I believe her problem with recurrent UTIs is rising from her urinary retention and the need for straight cath.    Will go ahead and check UA and urine culture today from in and out catheter and consider long term suppressive therapy.          Follow Up   Return in about 1 week (around 2/1/2024) for Follow up, With Dr. Katz.    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 Recurrent UTI  N39.0 599.0   3. Urinary retention  R33.9 788.20       Patient was given instructions and counseling regarding her condition or for health maintenance advice. Please see specific information pulled into the AVS if appropriate.     This document has been electronically signed by Kirstin Katz MD   January 25, 2024 09:14 EST    Dictated Utilizing Dragon Dictation: Part of this note may be an electronic transcription/translation of spoken language to printed text using the Dragon Dictation System.    "

## 2024-01-26 RX ORDER — SEMAGLUTIDE 1.34 MG/ML
INJECTION, SOLUTION SUBCUTANEOUS WEEKLY
COMMUNITY

## 2024-01-26 RX ORDER — TAMSULOSIN HYDROCHLORIDE 0.4 MG/1
1 CAPSULE ORAL DAILY
COMMUNITY

## 2024-01-26 RX ORDER — NITROGLYCERIN 80 MG/1
1 PATCH TRANSDERMAL DAILY
COMMUNITY

## 2024-01-26 RX ORDER — KETOCONAZOLE 20 MG/G
1 CREAM TOPICAL DAILY
COMMUNITY

## 2024-01-26 RX ORDER — INSULIN GLARGINE 100 [IU]/ML
INJECTION, SOLUTION SUBCUTANEOUS
COMMUNITY

## 2024-01-26 RX ORDER — CETIRIZINE HYDROCHLORIDE 10 MG/1
10 TABLET ORAL DAILY
COMMUNITY

## 2024-01-26 RX ORDER — ACETAMINOPHEN 500 MG
500 TABLET ORAL EVERY 6 HOURS PRN
COMMUNITY

## 2024-01-27 LAB — BACTERIA SPEC AEROBE CULT: ABNORMAL

## 2024-02-01 ENCOUNTER — OFFICE VISIT (OUTPATIENT)
Dept: INFECTIOUS DISEASES | Facility: CLINIC | Age: 84
End: 2024-02-01
Payer: MEDICARE

## 2024-02-01 VITALS
WEIGHT: 178.4 LBS | DIASTOLIC BLOOD PRESSURE: 69 MMHG | HEIGHT: 65 IN | SYSTOLIC BLOOD PRESSURE: 141 MMHG | HEART RATE: 72 BPM | OXYGEN SATURATION: 98 % | BODY MASS INDEX: 29.72 KG/M2

## 2024-02-01 DIAGNOSIS — I87.2 VENOUS STASIS DERMATITIS OF BOTH LOWER EXTREMITIES: ICD-10-CM

## 2024-02-01 DIAGNOSIS — Z16.12 EXTENDED SPECTRUM BETA LACTAMASE (ESBL) RESISTANCE: Primary | ICD-10-CM

## 2024-02-01 DIAGNOSIS — N39.0 RECURRENT UTI: ICD-10-CM

## 2024-02-01 DIAGNOSIS — R33.9 URINARY RETENTION: ICD-10-CM

## 2024-02-01 RX ORDER — CEPHALEXIN 500 MG/1
500 CAPSULE ORAL 3 TIMES DAILY
Qty: 42 CAPSULE | Refills: 0 | Status: SHIPPED | OUTPATIENT
Start: 2024-02-01 | End: 2024-02-15

## 2024-02-01 NOTE — PROGRESS NOTES
[facility]         Referring Provider: Christian Mckeon DO  [address] [facility] [place] [address]    Subjective      Chief Complaint  Follow-up (Extended spectrum beta lactamose ESBL resistance)    History of Present Illness  Maggie Perez is a 83 y.o. female who presents today to [facility]FECT[facility] for Follow Up . Past medical history is significant for recurrent UTI with ESBL Klebsiella most recently treated with oral Cipro with no improvement. No fever, and no chills. Has seen urology and follows up with Dr. Xie.     Past Medical History:   Diagnosis Date    Asthma     CHF (congestive heart failure)     Coronary artery disease        Past Surgical History:   Procedure Laterality Date    PERIPHERAL ARTERIAL STENT GRAFT         Social History     Socioeconomic History    Marital status:    Tobacco Use    Smoking status: Never       Family History  family history is not on file.    Immunization History   Administered Date(s) Administered    COVID-19 (PFIZER) Purple Cap Monovalent 09/09/2021        Allergies  Allergies   Allergen Reactions    Amoxicillin GI Intolerance    Hydroxychloroquine Sulfate GI Intolerance    Lactulose GI Intolerance    Nsaids GI Intolerance    Augmentin [Amoxicillin-Pot Clavulanate] Rash    Penicillins Rash    Potassium Clavulanate [Clavulanic Acid] Rash       The medication list has been reviewed and updated.   Current Medications    Current Outpatient Medications:     acetaminophen (TYLENOL) 500 MG tablet, Take 1 tablet by mouth Every 6 (Six) Hours As Needed for Mild Pain., Disp: , Rfl:     albuterol (PROVENTIL) (2.5 MG/3ML) 0.083% nebulizer solution, Take 2.5 mg by nebulization Every 4 (Four) Hours As Needed for Wheezing., Disp: , Rfl:     amLODIPine (NORVASC) 10 MG tablet, Take 1 tablet by mouth Daily., Disp: , Rfl:     apixaban (ELIQUIS) 2.5 MG tablet tablet, Take 1 tablet by mouth 2 (Two) Times a Day., Disp: , Rfl:      ascorbic acid (VITAMIN C) 500 MG tablet, Take 500 mg by mouth Daily., Disp: , Rfl:     carvedilol (COREG) 25 MG tablet, Take 1 tablet by mouth 2 (Two) Times a Day With Meals., Disp: , Rfl:     cephalexin (KEFLEX) 500 MG capsule, Take 1 capsule by mouth 3 (Three) Times a Day for 14 days., Disp: 42 capsule, Rfl: 0    cetirizine (zyrTEC) 10 MG tablet, Take 1 tablet by mouth Daily., Disp: , Rfl:     cholecalciferol (VITAMIN D3) 25 MCG (1000 UT) tablet, Take 1 tablet by mouth Daily., Disp: , Rfl:     ciprofloxacin (CIPRO) 250 MG tablet, Take 1 tablet by mouth 2 (Two) Times a Day., Disp: , Rfl:     Diclofenac Sodium (VOLTAREN) 1 % gel gel, Apply 4 g topically to the appropriate area as directed 4 (Four) Times a Day As Needed., Disp: , Rfl:     DULoxetine (CYMBALTA) 60 MG capsule, Take 1 capsule by mouth Daily., Disp: , Rfl:     estradiol (CLIMARA) 0.1 MG/24HR patch, Place 1 patch on the skin as directed by provider 1 (One) Time Per Week., Disp: , Rfl:     gentamicin (GARAMYCIN) 0.3 % ophthalmic solution, 1 drop Every 4 (Four) Hours., Disp: , Rfl:     glipizide (GLUCOTROL) 5 MG tablet, Take 1 tablet by mouth 2 (Two) Times a Day Before Meals., Disp: , Rfl:     hydrOXYzine pamoate (VISTARIL) 25 MG capsule, Take 1 capsule by mouth 3 (Three) Times a Day As Needed for Itching., Disp: , Rfl:     Insulin Glargine w/ Trans Port (Basaglar Tempo Pen) 100 UNIT/ML solution pen-injector, Inject  under the skin into the appropriate area as directed., Disp: , Rfl:     insulin lispro protamine-insulin lispro (humaLOG 50-50) (50-50) 100 UNIT/ML suspension injection, Inject  under the skin into the appropriate area as directed 2 (Two) Times a Day With Meals., Disp: , Rfl:     ketoconazole (NIZORAL) 2 % cream, Apply 1 application  topically to the appropriate area as directed Daily., Disp: , Rfl:     losartan (COZAAR) 50 MG tablet, Take 2 tablets by mouth Daily., Disp: , Rfl:     magnesium oxide (MAG-OX) 400 MG tablet, Take 1 tablet by mouth  Daily., Disp: 14 tablet, Rfl: 0    meclizine 25 MG chewable tablet chewable tablet, Chew 0.5 tablets 3 (Three) Times a Day As Needed., Disp: , Rfl:     nitroglycerin (NITRODUR) 0.4 MG/HR patch, Place 1 patch on the skin as directed by provider Daily., Disp: , Rfl:     pantoprazole (PROTONIX) 40 MG EC tablet, Take 1 tablet by mouth Daily., Disp: , Rfl:     Polyethylene Glycol 3350 powder, , Disp: , Rfl:     potassium chloride (K-DUR,KLOR-CON) 20 MEQ CR tablet, Take 1 tablet by mouth 2 (Two) Times a Day., Disp: , Rfl:     promethazine (PHENERGAN) 25 MG tablet, Take 1 tablet by mouth Every 6 (Six) Hours As Needed for Nausea or Vomiting., Disp: , Rfl:     rosuvastatin (CRESTOR) 10 MG tablet, Take 1 tablet by mouth Daily., Disp: , Rfl:     Semaglutide,0.25 or 0.5MG/DOS, (Ozempic, 0.25 or 0.5 MG/DOSE,) 2 MG/1.5ML solution pen-injector, Inject  under the skin into the appropriate area as directed 1 (One) Time Per Week., Disp: , Rfl:     senna (senna) 8.6 MG tablet, Take 1 tablet by mouth Daily., Disp: , Rfl:     tamsulosin (FLOMAX) 0.4 MG capsule 24 hr capsule, Take 1 capsule by mouth Daily., Disp: , Rfl:     torsemide (DEMADEX) 20 MG tablet, Take 1 tablet by mouth Daily., Disp: , Rfl:     traZODone (DESYREL) 50 MG tablet, Take 1 tablet by mouth Every Night., Disp: , Rfl:     triamcinolone (KENALOG) 0.025 % cream, Apply  topically to the appropriate area as directed 2 (Two) Times a Day., Disp: , Rfl:     vitamin B-12 (CYANOCOBALAMIN) 1000 MCG tablet, Take 1 tablet by mouth Daily., Disp: , Rfl:       Review of Systems    Review of Systems   Constitutional:  Negative for activity change, appetite change, chills, diaphoresis, fatigue and fever.   HENT:  Negative for congestion, dental problem, drooling, ear discharge, ear pain, facial swelling, postnasal drip, sinus pressure, sore throat and swollen glands.    Eyes:  Negative for blurred vision, double vision, pain, discharge and itching.   Respiratory:  Negative for apnea,  "cough, choking, chest tightness and shortness of breath.    Cardiovascular:  Negative for chest pain, palpitations and leg swelling.   Gastrointestinal:  Negative for abdominal distention, abdominal pain, diarrhea, nausea, rectal pain and vomiting.   Genitourinary:  Positive for difficulty urinating and dysuria. Negative for flank pain, frequency, hematuria, pelvic pain and pelvic pressure.   Neurological:  Negative for dizziness, tremors, seizures, syncope, speech difficulty and confusion.   Psychiatric/Behavioral:  Negative for agitation and behavioral problems.         Objective     Vital Signs:  /69 (BP Location: Right arm, Patient Position: Sitting, Cuff Size: Small Adult)   Pulse 72   Ht 165.1 cm (65\")   Wt 80.9 kg (178 lb 6.4 oz)   SpO2 98%   BMI 29.69 kg/m²   Estimated body mass index is 29.69 kg/m² as calculated from the following:    Height as of this encounter: 165.1 cm (65\").    Weight as of this encounter: 80.9 kg (178 lb 6.4 oz).    Physical Exam  Constitutional:       General: She is not in acute distress.     Appearance: Normal appearance. She is not ill-appearing, toxic-appearing or diaphoretic.   HENT:      Head: Normocephalic and atraumatic.      Nose: No congestion or rhinorrhea.      Mouth/Throat:      Pharynx: Oropharynx is clear. No oropharyngeal exudate or posterior oropharyngeal erythema.   Cardiovascular:      Rate and Rhythm: Normal rate and regular rhythm.      Heart sounds: No murmur heard.  Pulmonary:      Effort: No respiratory distress.      Breath sounds: No stridor. No wheezing, rhonchi or rales.   Chest:      Chest wall: No tenderness.   Abdominal:      General: There is no distension.      Tenderness: There is no abdominal tenderness. There is no right CVA tenderness, left CVA tenderness, guarding or rebound.   Musculoskeletal:         General: Swelling present. No tenderness or signs of injury.      Cervical back: No rigidity or tenderness.      Comments: Bilateral " "lower extremities with erythema but no drainage, no warmth   Skin:     Coloration: Skin is not jaundiced or pale.      Findings: No bruising, erythema or rash.   Neurological:      General: No focal deficit present.      Mental Status: She is alert. Mental status is at baseline.   Psychiatric:         Mood and Affect: Mood normal.         Behavior: Behavior normal.          Result Review :  The following data was reviewed by Kirstin Katz MD     Lab Results  Lab Results   Component Value Date    WBC 9.83 10/12/2020    HGB 14.0 10/12/2020    HCT 43.7 10/12/2020    MCV 84.5 10/12/2020     10/12/2020     Lab Results   Component Value Date    GLUCOSE 276 (H) 10/12/2020    BUN 27 (H) 10/12/2020    CREATININE 1.39 (H) 10/12/2020    EGFRIFNONA 36 (L) 10/12/2020    BCR 19.4 10/12/2020    K 4.6 10/12/2020    CO2 22.1 10/12/2020    CALCIUM 9.9 10/12/2020    ALBUMIN 4.26 10/12/2020    AST 15 10/12/2020    ALT 13 10/12/2020      Lab Results   Component Value Date    CRP 0.09 10/12/2020        No results found for: \"ACANTHNAEG\", \"AFBCX\", \"BPERTUSSISCX\", \"BLOODCX\"  No results found for: \"BCIDPCR\", \"CXREFLEX\", \"CSFCX\", \"CULTURETIS\"  No results found for: \"CULTURES\", \"HSVCX\", \"URCX\"  No results found for: \"EYECULTURE\", \"GCCX\", \"HSVCULTURE\", \"LABHSV\"  No results found for: \"LEGIONELLA\", \"MRSACX\", \"MUMPSCX\", \"MYCOPLASCX\"  No results found for: \"NOCARDIACX\", \"STOOLCX\"  Urine Culture   Date Value Ref Range Status   01/25/2024 >100,000 CFU/mL Escherichia coli (A)  Final     No results found for: \"VIRALCULTU\", \"WOUNDCX\"    Radiology Results              Assessment / Plan        Diagnoses and all orders for this visit:    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 Recurrent UTI    3. Urinary retention    4. Venous stasis dermatitis of both lower extremities    Other orders  -     cephalexin (KEFLEX) 500 MG capsule; Take 1 capsule by mouth 3 (Three) Times a Day for 14 days.  Dispense: 42 capsule; " Refill: 0      I believe her problem with recurrent UTIs is rising from her urinary retention and the need for straight cath.     I checked UA and urine culture last visit from in and out catheter and consider long term suppressive therapy.    Culture showed growth of E. Coli resistant only to Levofloxacin and Bactrim.    She is not self-cathing as often as she is supposed to three times a day.    Has new onset erythema to the lower extremity with history of venous stasis cellulitis in the past.    Will need to do suppressive therapy after her 2 weeks for Cephalexin.      Follow Up   Return in about 2 weeks (around 2/15/2024) for Follow up, With Dr. Katz.    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 Recurrent UTI  N39.0 599.0   3. Urinary retention  R33.9 788.20   4. Venous stasis dermatitis of both lower extremities  I87.2 454.1       Patient was given instructions and counseling regarding her condition or for health maintenance advice. Please see specific information pulled into the AVS if appropriate.     This document has been electronically signed by Kirstin Katz MD   February 1, 2024 09:10 EST    Dictated Utilizing Dragon Dictation: Part of this note may be an electronic transcription/translation of spoken language to printed text using the Dragon Dictation System.

## 2024-02-15 ENCOUNTER — OFFICE VISIT (OUTPATIENT)
Dept: INFECTIOUS DISEASES | Facility: CLINIC | Age: 84
End: 2024-02-15
Payer: MEDICARE

## 2024-02-15 VITALS
OXYGEN SATURATION: 98 % | WEIGHT: 183 LBS | HEART RATE: 86 BPM | DIASTOLIC BLOOD PRESSURE: 70 MMHG | HEIGHT: 65 IN | BODY MASS INDEX: 30.49 KG/M2 | TEMPERATURE: 96.3 F | SYSTOLIC BLOOD PRESSURE: 156 MMHG

## 2024-02-15 DIAGNOSIS — Z16.12 EXTENDED SPECTRUM BETA LACTAMASE (ESBL) RESISTANCE: Primary | ICD-10-CM

## 2024-02-15 DIAGNOSIS — N39.0 RECURRENT UTI: ICD-10-CM

## 2024-02-15 DIAGNOSIS — R33.9 URINARY RETENTION: ICD-10-CM

## 2024-02-15 DIAGNOSIS — I87.2 VENOUS STASIS DERMATITIS OF BOTH LOWER EXTREMITIES: ICD-10-CM

## 2024-02-15 RX ORDER — CEPHALEXIN 500 MG/1
500 CAPSULE ORAL EVERY 12 HOURS
Qty: 180 CAPSULE | Refills: 0 | Status: SHIPPED | OUTPATIENT
Start: 2024-02-15 | End: 2024-05-15

## 2024-02-29 ENCOUNTER — OFFICE VISIT (OUTPATIENT)
Dept: INFECTIOUS DISEASES | Facility: CLINIC | Age: 84
End: 2024-02-29
Payer: MEDICARE

## 2024-02-29 VITALS
OXYGEN SATURATION: 97 % | HEIGHT: 65 IN | BODY MASS INDEX: 29.96 KG/M2 | WEIGHT: 179.8 LBS | SYSTOLIC BLOOD PRESSURE: 128 MMHG | DIASTOLIC BLOOD PRESSURE: 70 MMHG | HEART RATE: 69 BPM

## 2024-02-29 DIAGNOSIS — N39.0 RECURRENT UTI: ICD-10-CM

## 2024-02-29 DIAGNOSIS — Z16.12 EXTENDED SPECTRUM BETA LACTAMASE (ESBL) RESISTANCE: Primary | ICD-10-CM

## 2024-02-29 LAB
BACTERIA UR QL AUTO: ABNORMAL /HPF
BILIRUB UR QL STRIP: NEGATIVE
CLARITY UR: CLEAR
COLOR UR: YELLOW
GLUCOSE UR STRIP-MCNC: NEGATIVE MG/DL
HGB UR QL STRIP.AUTO: ABNORMAL
HYALINE CASTS UR QL AUTO: ABNORMAL /LPF
KETONES UR QL STRIP: NEGATIVE
LEUKOCYTE ESTERASE UR QL STRIP.AUTO: NEGATIVE
NITRITE UR QL STRIP: NEGATIVE
PH UR STRIP.AUTO: 5.5 [PH] (ref 5–8)
PROT UR QL STRIP: NEGATIVE
RBC # UR STRIP: ABNORMAL /HPF
REF LAB TEST METHOD: ABNORMAL
SP GR UR STRIP: 1.01 (ref 1–1.03)
SQUAMOUS #/AREA URNS HPF: ABNORMAL /HPF
UROBILINOGEN UR QL STRIP: ABNORMAL
WBC # UR STRIP: ABNORMAL /HPF

## 2024-02-29 PROCEDURE — 81001 URINALYSIS AUTO W/SCOPE: CPT | Performed by: INTERNAL MEDICINE

## 2024-02-29 PROCEDURE — 87086 URINE CULTURE/COLONY COUNT: CPT | Performed by: INTERNAL MEDICINE

## 2024-02-29 NOTE — PROGRESS NOTES
[facility]         Referring Provider: No referring provider defined for this encounter.    Subjective      Chief Complaint  Follow-up (Extended spectrum beta lactamase (ESBL) resistanc)    History of Present Illness  Maggie Perez is a 83 y.o. female who presents today to [facility] for Follow Up . Past medical history is significant for recurrent UTI with ESBL Klebsiella most recently treated with oral Cipro with no improvement. No fever, and no chills. Has seen urology and follows up with Dr. Xie and Urologist in [place].  Patient feels little better but continues to have significant discomfort with in and out catheters. Patient has no fever and no chills.    Past Medical History:   Diagnosis Date    Asthma     CHF (congestive heart failure)     Coronary artery disease        Past Surgical History:   Procedure Laterality Date    PERIPHERAL ARTERIAL STENT GRAFT         Social History     Socioeconomic History    Marital status:    Tobacco Use    Smoking status: Never       Family History  family history is not on file.    Immunization History   Administered Date(s) Administered    COVID-19 (PFIZER) Purple Cap Monovalent 09/09/2021        Allergies  Allergies   Allergen Reactions    Amoxicillin GI Intolerance    Hydroxychloroquine Sulfate GI Intolerance    Lactulose GI Intolerance    Nsaids GI Intolerance    Augmentin [Amoxicillin-Pot Clavulanate] Rash    Penicillins Rash    Potassium Clavulanate [Clavulanic Acid] Rash       The medication list has been reviewed and updated.   Current Medications    Current Outpatient Medications:     acetaminophen (TYLENOL) 500 MG tablet, Take 1 tablet by mouth Every 6 (Six) Hours As Needed for Mild Pain., Disp: , Rfl:     albuterol (PROVENTIL) (2.5 MG/3ML) 0.083% nebulizer solution, Take 2.5 mg by nebulization Every 4 (Four) Hours As Needed for Wheezing., Disp: , Rfl:     amLODIPine (NORVASC) 10 MG tablet, Take  1 tablet by mouth Daily., Disp: , Rfl:     apixaban (ELIQUIS) 2.5 MG tablet tablet, Take 1 tablet by mouth 2 (Two) Times a Day., Disp: , Rfl:     ascorbic acid (VITAMIN C) 500 MG tablet, Take 1 tablet by mouth Daily., Disp: , Rfl:     carvedilol (COREG) 25 MG tablet, Take 1 tablet by mouth 2 (Two) Times a Day With Meals., Disp: , Rfl:     cephalexin (KEFLEX) 500 MG capsule, Take 1 capsule by mouth Every 12 (Twelve) Hours for 90 days., Disp: 180 capsule, Rfl: 0    cetirizine (zyrTEC) 10 MG tablet, Take 1 tablet by mouth Daily., Disp: , Rfl:     cholecalciferol (VITAMIN D3) 25 MCG (1000 UT) tablet, Take 1 tablet by mouth Daily., Disp: , Rfl:     ciprofloxacin (CIPRO) 250 MG tablet, Take 1 tablet by mouth 2 (Two) Times a Day., Disp: , Rfl:     Diclofenac Sodium (VOLTAREN) 1 % gel gel, Apply 4 g topically to the appropriate area as directed 4 (Four) Times a Day As Needed., Disp: , Rfl:     DULoxetine (CYMBALTA) 60 MG capsule, Take 1 capsule by mouth Daily., Disp: , Rfl:     estradiol (CLIMARA) 0.1 MG/24HR patch, Place 1 patch on the skin as directed by provider 1 (One) Time Per Week., Disp: , Rfl:     gentamicin (GARAMYCIN) 0.3 % ophthalmic solution, 1 drop Every 4 (Four) Hours., Disp: , Rfl:     glipizide (GLUCOTROL) 5 MG tablet, Take 1 tablet by mouth 2 (Two) Times a Day Before Meals., Disp: , Rfl:     hydrOXYzine pamoate (VISTARIL) 25 MG capsule, Take 1 capsule by mouth 3 (Three) Times a Day As Needed for Itching., Disp: , Rfl:     Insulin Glargine w/ Trans Port (Basaglar Tempo Pen) 100 UNIT/ML solution pen-injector, Inject  under the skin into the appropriate area as directed., Disp: , Rfl:     insulin lispro protamine-insulin lispro (humaLOG 50-50) (50-50) 100 UNIT/ML suspension injection, Inject  under the skin into the appropriate area as directed 2 (Two) Times a Day With Meals., Disp: , Rfl:     ketoconazole (NIZORAL) 2 % cream, Apply 1 Application topically to the appropriate area as directed Daily., Disp: ,  Rfl:     losartan (COZAAR) 50 MG tablet, Take 2 tablets by mouth Daily., Disp: , Rfl:     magnesium oxide (MAG-OX) 400 MG tablet, Take 1 tablet by mouth Daily., Disp: 14 tablet, Rfl: 0    meclizine 25 MG chewable tablet chewable tablet, Chew 0.5 tablets 3 (Three) Times a Day As Needed., Disp: , Rfl:     nitroglycerin (NITRODUR) 0.4 MG/HR patch, Place 1 patch on the skin as directed by provider Daily., Disp: , Rfl:     pantoprazole (PROTONIX) 40 MG EC tablet, Take 1 tablet by mouth Daily., Disp: , Rfl:     Polyethylene Glycol 3350 powder, , Disp: , Rfl:     potassium chloride (K-DUR,KLOR-CON) 20 MEQ CR tablet, Take 1 tablet by mouth 2 (Two) Times a Day., Disp: , Rfl:     promethazine (PHENERGAN) 25 MG tablet, Take 1 tablet by mouth Every 6 (Six) Hours As Needed for Nausea or Vomiting., Disp: , Rfl:     rosuvastatin (CRESTOR) 10 MG tablet, Take 1 tablet by mouth Daily., Disp: , Rfl:     Semaglutide,0.25 or 0.5MG/DOS, (Ozempic, 0.25 or 0.5 MG/DOSE,) 2 MG/1.5ML solution pen-injector, Inject  under the skin into the appropriate area as directed 1 (One) Time Per Week., Disp: , Rfl:     senna (senna) 8.6 MG tablet, Take 1 tablet by mouth Daily., Disp: , Rfl:     tamsulosin (FLOMAX) 0.4 MG capsule 24 hr capsule, Take 1 capsule by mouth Daily., Disp: , Rfl:     torsemide (DEMADEX) 20 MG tablet, Take 1 tablet by mouth Daily., Disp: , Rfl:     traZODone (DESYREL) 50 MG tablet, Take 1 tablet by mouth Every Night., Disp: , Rfl:     triamcinolone (KENALOG) 0.025 % cream, Apply  topically to the appropriate area as directed 2 (Two) Times a Day., Disp: , Rfl:     vitamin B-12 (CYANOCOBALAMIN) 1000 MCG tablet, Take 1 tablet by mouth Daily., Disp: , Rfl:       Review of Systems    Review of Systems   Constitutional:  Negative for activity change, appetite change, chills, diaphoresis, fatigue and fever.   HENT:  Negative for congestion, dental problem, drooling, ear discharge, ear pain, facial swelling, postnasal drip, sinus pressure,  "sore throat and swollen glands.    Eyes:  Negative for blurred vision, double vision, pain, discharge and itching.   Respiratory:  Negative for apnea, cough, choking, chest tightness and shortness of breath.    Cardiovascular:  Negative for chest pain, palpitations and leg swelling.   Gastrointestinal:  Negative for abdominal distention, abdominal pain, diarrhea, nausea, rectal pain and vomiting.   Genitourinary:  Negative for difficulty urinating, dysuria, flank pain, frequency, hematuria, pelvic pain and pelvic pressure.   Neurological:  Negative for dizziness, tremors, seizures, syncope, speech difficulty and confusion.   Psychiatric/Behavioral:  Negative for agitation and behavioral problems.         Objective     Vital Signs:  /70 (BP Location: Right arm, Patient Position: Sitting, Cuff Size: Small Adult)   Pulse 69   Ht 165.1 cm (65\")   Wt 81.6 kg (179 lb 12.8 oz)   SpO2 97%   BMI 29.92 kg/m²   Estimated body mass index is 29.92 kg/m² as calculated from the following:    Height as of this encounter: 165.1 cm (65\").    Weight as of this encounter: 81.6 kg (179 lb 12.8 oz).    Physical Exam  Constitutional:       General: She is not in acute distress.     Appearance: Normal appearance. She is not ill-appearing, toxic-appearing or diaphoretic.   HENT:      Head: Normocephalic and atraumatic.      Nose: No congestion or rhinorrhea.      Mouth/Throat:      Pharynx: Oropharynx is clear. No oropharyngeal exudate or posterior oropharyngeal erythema.   Cardiovascular:      Rate and Rhythm: Normal rate and regular rhythm.      Heart sounds: No murmur heard.  Pulmonary:      Effort: No respiratory distress.      Breath sounds: No stridor. No wheezing, rhonchi or rales.   Chest:      Chest wall: No tenderness.   Abdominal:      General: There is no distension.      Tenderness: There is no abdominal tenderness. There is no right CVA tenderness, left CVA tenderness, guarding or rebound.   Musculoskeletal:         " "General: No swelling, tenderness or signs of injury.      Cervical back: No rigidity or tenderness.   Skin:     Coloration: Skin is not jaundiced or pale.      Findings: No bruising, erythema or rash.   Neurological:      General: No focal deficit present.      Mental Status: She is alert. Mental status is at baseline.   Psychiatric:         Mood and Affect: Mood normal.         Behavior: Behavior normal.          Result Review :  The following data was reviewed by Kirstin Katz MD     Lab Results  Lab Results   Component Value Date    WBC 9.83 10/12/2020    HGB 14.0 10/12/2020    HCT 43.7 10/12/2020    MCV 84.5 10/12/2020     10/12/2020     Lab Results   Component Value Date    GLUCOSE 276 (H) 10/12/2020    BUN 27 (H) 10/12/2020    CREATININE 1.39 (H) 10/12/2020    EGFRIFNONA 36 (L) 10/12/2020    BCR 19.4 10/12/2020    K 4.6 10/12/2020    CO2 22.1 10/12/2020    CALCIUM 9.9 10/12/2020    ALBUMIN 4.26 10/12/2020    AST 15 10/12/2020    ALT 13 10/12/2020      Lab Results   Component Value Date    CRP 0.09 10/12/2020        No results found for: \"ACANTHNAEG\", \"AFBCX\", \"BPERTUSSISCX\", \"BLOODCX\"  No results found for: \"BCIDPCR\", \"CXREFLEX\", \"CSFCX\", \"CULTURETIS\"  No results found for: \"CULTURES\", \"HSVCX\", \"URCX\"  No results found for: \"EYECULTURE\", \"GCCX\", \"HSVCULTURE\", \"LABHSV\"  No results found for: \"LEGIONELLA\", \"MRSACX\", \"MUMPSCX\", \"MYCOPLASCX\"  No results found for: \"NOCARDIACX\", \"STOOLCX\"  No results found for: \"THROATCX\", \"UNSTIMCULT\", \"URINECX\", \"CULTURE\", \"VZVCULTUR\"  No results found for: \"VIRALCULTU\", \"WOUNDCX\"    Radiology Results              Assessment / Plan        Diagnoses and all orders for this visit:    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 Recurrent UTI  -     Urinalysis With Microscopic - Urine, Clean Catch; Future  -     Urine " Culture - Urine, Urine, Catheter In/Out; Future      I believe her problem with recurrent UTIs is rising from her urinary retention and the need for straight cath.     Most recent urine culture showing growth of E. Coli resistant only to Levofloxacin and Bactrim.     She is not self-cathing as often as she is supposed to three times a day.     Has history of venous stasis cellulitis in the past.     Will need to do suppressive therapy after her 2 weeks for Cephalexin and continue with bid dosing for the next 3 months and follow up with another UA and urine culture today.     Urology follow up as patient continues to have significant retention volumes.     Will check UA and urine culture today through in and out catheter.          Follow Up   Return in about 3 weeks (around 3/21/2024) for Follow up, With Dr. Katz.    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 Recurrent UTI  N39.0 599.0       Patient was given instructions and counseling regarding her condition or for health maintenance advice. Please see specific information pulled into the AVS if appropriate.     This document has been electronically signed by Kirstin Katz MD   February 29, 2024 10:12 EST    Dictated Utilizing Dragon Dictation: Part of this note may be an electronic transcription/translation of spoken language to printed text using the Dragon Dictation System.

## 2024-03-01 LAB — BACTERIA SPEC AEROBE CULT: NO GROWTH

## 2024-03-21 ENCOUNTER — OFFICE VISIT (OUTPATIENT)
Dept: INFECTIOUS DISEASES | Facility: CLINIC | Age: 84
End: 2024-03-21
Payer: MEDICARE

## 2024-03-21 VITALS
WEIGHT: 177.4 LBS | HEART RATE: 68 BPM | OXYGEN SATURATION: 97 % | SYSTOLIC BLOOD PRESSURE: 138 MMHG | TEMPERATURE: 97.5 F | BODY MASS INDEX: 29.56 KG/M2 | HEIGHT: 65 IN | DIASTOLIC BLOOD PRESSURE: 68 MMHG

## 2024-03-21 DIAGNOSIS — Z16.12 EXTENDED SPECTRUM BETA LACTAMASE (ESBL) RESISTANCE: Primary | ICD-10-CM

## 2024-03-21 DIAGNOSIS — A09 DIARRHEA OF INFECTIOUS ORIGIN: ICD-10-CM

## 2024-03-21 DIAGNOSIS — R33.9 URINARY RETENTION: ICD-10-CM

## 2024-03-21 DIAGNOSIS — N39.0 RECURRENT UTI: ICD-10-CM

## 2024-03-21 NOTE — PROGRESS NOTES
[facility]         Referring Provider: No referring provider defined for this encounter.    Subjective      Chief Complaint  Follow-up (Extended spectrum beta lactamase (ESBL) resistance)    History of Present Illness  Maggie Perez is a 83 y.o. female who presents today to [facility] for Follow Up . Past medical history is significant for recurrent UTI with ESBL Klebsiella most recently treated with oral Cipro with no improvement. No fever, and no chills. Has seen urology and follows up with Dr. Xie and Urologist in [place].  Patient has significant discomfort with in and out catheters. Patient has no fever and no chills.  Started with diarrhea yesterday but her urinary symptoms are resolved.    Past Medical History:   Diagnosis Date    Asthma     CHF (congestive heart failure)     Coronary artery disease        Past Surgical History:   Procedure Laterality Date    PERIPHERAL ARTERIAL STENT GRAFT         Social History     Socioeconomic History    Marital status:    Tobacco Use    Smoking status: Never       Family History  family history is not on file.    Immunization History   Administered Date(s) Administered    COVID-19 (PFIZER) Purple Cap Monovalent 09/09/2021        Allergies  Allergies   Allergen Reactions    Amoxicillin GI Intolerance    Hydroxychloroquine Sulfate GI Intolerance    Lactulose GI Intolerance    Nsaids GI Intolerance    Augmentin [Amoxicillin-Pot Clavulanate] Rash    Penicillins Rash    Potassium Clavulanate [Clavulanic Acid] Rash       The medication list has been reviewed and updated.   Current Medications    Current Outpatient Medications:     acetaminophen (TYLENOL) 500 MG tablet, Take 1 tablet by mouth Every 6 (Six) Hours As Needed for Mild Pain., Disp: , Rfl:     albuterol (PROVENTIL) (2.5 MG/3ML) 0.083% nebulizer solution, Take 2.5 mg by nebulization Every 4 (Four) Hours As Needed for Wheezing., Disp: , Rfl:      amLODIPine (NORVASC) 10 MG tablet, Take 1 tablet by mouth Daily., Disp: , Rfl:     apixaban (ELIQUIS) 2.5 MG tablet tablet, Take 1 tablet by mouth 2 (Two) Times a Day., Disp: , Rfl:     ascorbic acid (VITAMIN C) 500 MG tablet, Take 1 tablet by mouth Daily., Disp: , Rfl:     carvedilol (COREG) 25 MG tablet, Take 1 tablet by mouth 2 (Two) Times a Day With Meals., Disp: , Rfl:     cephalexin (KEFLEX) 500 MG capsule, Take 1 capsule by mouth Every 12 (Twelve) Hours for 90 days., Disp: 180 capsule, Rfl: 0    cetirizine (zyrTEC) 10 MG tablet, Take 1 tablet by mouth Daily., Disp: , Rfl:     cholecalciferol (VITAMIN D3) 25 MCG (1000 UT) tablet, Take 1 tablet by mouth Daily., Disp: , Rfl:     ciprofloxacin (CIPRO) 250 MG tablet, Take 1 tablet by mouth 2 (Two) Times a Day., Disp: , Rfl:     Diclofenac Sodium (VOLTAREN) 1 % gel gel, Apply 4 g topically to the appropriate area as directed 4 (Four) Times a Day As Needed., Disp: , Rfl:     DULoxetine (CYMBALTA) 60 MG capsule, Take 1 capsule by mouth Daily., Disp: , Rfl:     estradiol (CLIMARA) 0.1 MG/24HR patch, Place 1 patch on the skin as directed by provider 1 (One) Time Per Week., Disp: , Rfl:     gentamicin (GARAMYCIN) 0.3 % ophthalmic solution, 1 drop Every 4 (Four) Hours., Disp: , Rfl:     glipizide (GLUCOTROL) 5 MG tablet, Take 1 tablet by mouth 2 (Two) Times a Day Before Meals., Disp: , Rfl:     hydrOXYzine pamoate (VISTARIL) 25 MG capsule, Take 1 capsule by mouth 3 (Three) Times a Day As Needed for Itching., Disp: , Rfl:     Insulin Glargine w/ Trans Port (Basaglar Tempo Pen) 100 UNIT/ML solution pen-injector, Inject  under the skin into the appropriate area as directed., Disp: , Rfl:     insulin lispro protamine-insulin lispro (humaLOG 50-50) (50-50) 100 UNIT/ML suspension injection, Inject  under the skin into the appropriate area as directed 2 (Two) Times a Day With Meals., Disp: , Rfl:     ketoconazole (NIZORAL) 2 % cream, Apply 1 Application topically to the  appropriate area as directed Daily., Disp: , Rfl:     losartan (COZAAR) 50 MG tablet, Take 2 tablets by mouth Daily., Disp: , Rfl:     magnesium oxide (MAG-OX) 400 MG tablet, Take 1 tablet by mouth Daily., Disp: 14 tablet, Rfl: 0    meclizine 25 MG chewable tablet chewable tablet, Chew 0.5 tablets 3 (Three) Times a Day As Needed., Disp: , Rfl:     nitroglycerin (NITRODUR) 0.4 MG/HR patch, Place 1 patch on the skin as directed by provider Daily., Disp: , Rfl:     pantoprazole (PROTONIX) 40 MG EC tablet, Take 1 tablet by mouth Daily., Disp: , Rfl:     Polyethylene Glycol 3350 powder, , Disp: , Rfl:     potassium chloride (K-DUR,KLOR-CON) 20 MEQ CR tablet, Take 1 tablet by mouth 2 (Two) Times a Day., Disp: , Rfl:     promethazine (PHENERGAN) 25 MG tablet, Take 1 tablet by mouth Every 6 (Six) Hours As Needed for Nausea or Vomiting., Disp: , Rfl:     rosuvastatin (CRESTOR) 10 MG tablet, Take 1 tablet by mouth Daily., Disp: , Rfl:     Semaglutide,0.25 or 0.5MG/DOS, (Ozempic, 0.25 or 0.5 MG/DOSE,) 2 MG/1.5ML solution pen-injector, Inject  under the skin into the appropriate area as directed 1 (One) Time Per Week., Disp: , Rfl:     senna (senna) 8.6 MG tablet, Take 1 tablet by mouth Daily., Disp: , Rfl:     tamsulosin (FLOMAX) 0.4 MG capsule 24 hr capsule, Take 1 capsule by mouth Daily., Disp: , Rfl:     torsemide (DEMADEX) 20 MG tablet, Take 1 tablet by mouth Daily., Disp: , Rfl:     traZODone (DESYREL) 50 MG tablet, Take 1 tablet by mouth Every Night., Disp: , Rfl:     triamcinolone (KENALOG) 0.025 % cream, Apply  topically to the appropriate area as directed 2 (Two) Times a Day., Disp: , Rfl:     vitamin B-12 (CYANOCOBALAMIN) 1000 MCG tablet, Take 1 tablet by mouth Daily., Disp: , Rfl:       Review of Systems    Review of Systems   Constitutional:  Negative for activity change, appetite change, chills, diaphoresis, fatigue and fever.   HENT:  Negative for congestion, dental problem, drooling, ear discharge, ear pain,  "facial swelling, postnasal drip, sinus pressure, sore throat and swollen glands.    Eyes:  Negative for blurred vision, double vision, pain, discharge and itching.   Respiratory:  Negative for apnea, cough, choking, chest tightness and shortness of breath.    Cardiovascular:  Negative for chest pain, palpitations and leg swelling.   Gastrointestinal:  Positive for diarrhea. Negative for abdominal distention, abdominal pain, nausea, rectal pain and vomiting.   Genitourinary:  Negative for difficulty urinating, dysuria, flank pain, frequency, hematuria, pelvic pain and pelvic pressure.   Neurological:  Negative for dizziness, tremors, seizures, syncope, speech difficulty and confusion.   Psychiatric/Behavioral:  Negative for agitation and behavioral problems.         Objective     Vital Signs:  /68 (BP Location: Right arm, Patient Position: Sitting, Cuff Size: Small Adult)   Pulse 68   Temp 97.5 °F (36.4 °C) (Oral)   Ht 165.1 cm (65\")   Wt 80.5 kg (177 lb 6.4 oz)   SpO2 97%   BMI 29.52 kg/m²   Estimated body mass index is 29.52 kg/m² as calculated from the following:    Height as of this encounter: 165.1 cm (65\").    Weight as of this encounter: 80.5 kg (177 lb 6.4 oz).    Physical Exam  Constitutional:       General: She is not in acute distress.     Appearance: Normal appearance. She is not ill-appearing, toxic-appearing or diaphoretic.   HENT:      Head: Normocephalic and atraumatic.      Nose: No congestion or rhinorrhea.      Mouth/Throat:      Pharynx: Oropharynx is clear. No oropharyngeal exudate or posterior oropharyngeal erythema.   Cardiovascular:      Rate and Rhythm: Normal rate and regular rhythm.      Heart sounds: No murmur heard.  Pulmonary:      Effort: No respiratory distress.      Breath sounds: No stridor. No wheezing, rhonchi or rales.   Chest:      Chest wall: No tenderness.   Abdominal:      General: There is no distension.      Tenderness: There is no abdominal tenderness. There is " "no right CVA tenderness, left CVA tenderness, guarding or rebound.   Musculoskeletal:         General: No swelling, tenderness or signs of injury.      Cervical back: No rigidity or tenderness.   Skin:     Coloration: Skin is not jaundiced or pale.      Findings: No bruising, erythema or rash.   Neurological:      General: No focal deficit present.      Mental Status: She is alert. Mental status is at baseline.   Psychiatric:         Mood and Affect: Mood normal.         Behavior: Behavior normal.          Result Review :  The following data was reviewed by Kirstin Katz MD     Lab Results  Lab Results   Component Value Date    WBC 9.83 10/12/2020    HGB 14.0 10/12/2020    HCT 43.7 10/12/2020    MCV 84.5 10/12/2020     10/12/2020     Lab Results   Component Value Date    GLUCOSE 276 (H) 10/12/2020    BUN 27 (H) 10/12/2020    CREATININE 1.39 (H) 10/12/2020    EGFRIFNONA 36 (L) 10/12/2020    BCR 19.4 10/12/2020    K 4.6 10/12/2020    CO2 22.1 10/12/2020    CALCIUM 9.9 10/12/2020    ALBUMIN 4.26 10/12/2020    AST 15 10/12/2020    ALT 13 10/12/2020      Lab Results   Component Value Date    CRP 0.09 10/12/2020        No results found for: \"ACANTHNAEG\", \"AFBCX\", \"BPERTUSSISCX\", \"BLOODCX\"  No results found for: \"BCIDPCR\", \"CXREFLEX\", \"CSFCX\", \"CULTURETIS\"  No results found for: \"CULTURES\", \"HSVCX\", \"URCX\"  No results found for: \"EYECULTURE\", \"GCCX\", \"HSVCULTURE\", \"LABHSV\"  No results found for: \"LEGIONELLA\", \"MRSACX\", \"MUMPSCX\", \"MYCOPLASCX\"  No results found for: \"NOCARDIACX\", \"STOOLCX\"  No results found for: \"THROATCX\", \"UNSTIMCULT\", \"URINECX\", \"CULTURE\", \"VZVCULTUR\"  No results found for: \"VIRALCULTU\", \"WOUNDCX\"    Radiology Results              Assessment / Plan        Diagnoses and all orders for this visit:    1. 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 Recurrent UTI  -     Clostridioides difficile EIA - Stool, Per Rectum; Future  -     Clostridioides difficile Toxin, PCR - Stool, Per Rectum; Future    3. Urinary retention  -     Clostridioides difficile EIA - Stool, Per Rectum; Future  -     Clostridioides difficile Toxin, PCR - Stool, Per Rectum; Future    4. Diarrhea of infectious origin  -     Clostridioides difficile Toxin, PCR - Stool, Per Rectum; Future      I believe her problem with recurrent UTIs is rising from her urinary retention and the need for straight cath.     Most recent urine culture showing growth of E. Coli resistant only to Levofloxacin and Bactrim.     She is not self-cathing as often as she is supposed to three times a day. She continues to have significant post void residual.     Has history of venous stasis cellulitis in the past.     Will need to do suppressive therapy after her 2 weeks for Cephalexin and continue with bid dosing for the next 3 months.     Urology follow up as patient continues to have significant retention volumes.     If diarrhea does not resolved by Saturday, will need to check for Cdiff.          Follow Up   No follow-ups on file.    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 Recurrent UTI  N39.0 599.0   3. Urinary retention  R33.9 788.20   4. Diarrhea of infectious origin  A09 009.2       Patient was given instructions and counseling regarding her condition or for health maintenance advice. Please see specific information pulled into the AVS if appropriate.     This document has been electronically signed by Kirstin Katz MD   March 21, 2024 09:57 EDT    Dictated Utilizing Dragon Dictation: Part of this note may be an electronic transcription/translation of spoken language to printed text using the Dragon Dictation System.

## 2024-04-15 ENCOUNTER — OFFICE VISIT (OUTPATIENT)
Dept: INFECTIOUS DISEASES | Facility: CLINIC | Age: 84
End: 2024-04-15
Payer: MEDICARE

## 2024-04-15 VITALS
OXYGEN SATURATION: 97 % | HEIGHT: 65 IN | DIASTOLIC BLOOD PRESSURE: 71 MMHG | SYSTOLIC BLOOD PRESSURE: 128 MMHG | BODY MASS INDEX: 29.52 KG/M2 | HEART RATE: 74 BPM | WEIGHT: 177.2 LBS

## 2024-04-15 DIAGNOSIS — Z16.12 EXTENDED SPECTRUM BETA LACTAMASE (ESBL) RESISTANCE: Primary | ICD-10-CM

## 2024-04-15 DIAGNOSIS — R33.9 URINARY RETENTION: ICD-10-CM

## 2024-04-15 DIAGNOSIS — I87.2 VENOUS STASIS DERMATITIS OF BOTH LOWER EXTREMITIES: ICD-10-CM

## 2024-04-15 DIAGNOSIS — N39.0 RECURRENT UTI: ICD-10-CM

## 2024-04-15 PROCEDURE — 99214 OFFICE O/P EST MOD 30 MIN: CPT | Performed by: INTERNAL MEDICINE

## 2024-04-15 PROCEDURE — 87086 URINE CULTURE/COLONY COUNT: CPT | Performed by: INTERNAL MEDICINE

## 2024-04-15 PROCEDURE — 81001 URINALYSIS AUTO W/SCOPE: CPT | Performed by: INTERNAL MEDICINE

## 2024-04-15 NOTE — PROGRESS NOTES
[facility]         Referring Provider: Christian Mckeon, DO  [address] [place] [address]    Subjective      Chief Complaint  Follow-Up (Extended spectrum beta latamase ESBL resistance)    History of Present Illness  Maggie Perez is a 83 y.o. female who presents today to [facility] for Follow Up .     Past medical history is significant for recurrent UTI with ESBL Klebsiella most recently treated with oral Cipro with no improvement. No fever, and no chills. Has seen urology and follows up with Dr. Xie and Urologist in [place].  Patient has significant discomfort with in and out catheters. Patient has no fever and no chills.  Started with diarrhea and her urinary symptoms are worse. Diarrhea has much improved.    Past Medical History:   Diagnosis Date    Asthma     CHF (congestive heart failure)     Coronary artery disease        Past Surgical History:   Procedure Laterality Date    PERIPHERAL ARTERIAL STENT GRAFT         Social History     Socioeconomic History    Marital status:    Tobacco Use    Smoking status: Never       Family History  family history is not on file.    Immunization History   Administered Date(s) Administered    COVID-19 (PFIZER) Purple Cap Monovalent 09/09/2021        Allergies  Allergies   Allergen Reactions    Amoxicillin GI Intolerance    Hydroxychloroquine Sulfate GI Intolerance    Lactulose GI Intolerance    Nsaids GI Intolerance    Augmentin [Amoxicillin-Pot Clavulanate] Rash    Penicillins Rash    Potassium Clavulanate [Clavulanic Acid] Rash       The medication list has been reviewed and updated.   Current Medications    Current Outpatient Medications:     acetaminophen (TYLENOL) 500 MG tablet, Take 1 tablet by mouth Every 6 (Six) Hours As Needed for Mild Pain., Disp: , Rfl:     albuterol (PROVENTIL) (2.5 MG/3ML) 0.083% nebulizer solution, Take 2.5 mg by nebulization Every 4 (Four) Hours As Needed for  Wheezing., Disp: , Rfl:     amLODIPine (NORVASC) 10 MG tablet, Take 1 tablet by mouth Daily., Disp: , Rfl:     apixaban (ELIQUIS) 2.5 MG tablet tablet, Take 1 tablet by mouth 2 (Two) Times a Day., Disp: , Rfl:     ascorbic acid (VITAMIN C) 500 MG tablet, Take 1 tablet by mouth Daily., Disp: , Rfl:     carvedilol (COREG) 25 MG tablet, Take 1 tablet by mouth 2 (Two) Times a Day With Meals., Disp: , Rfl:     cephalexin (KEFLEX) 500 MG capsule, Take 1 capsule by mouth Every 12 (Twelve) Hours for 90 days., Disp: 180 capsule, Rfl: 0    cetirizine (zyrTEC) 10 MG tablet, Take 1 tablet by mouth Daily., Disp: , Rfl:     cholecalciferol (VITAMIN D3) 25 MCG (1000 UT) tablet, Take 1 tablet by mouth Daily., Disp: , Rfl:     ciprofloxacin (CIPRO) 250 MG tablet, Take 1 tablet by mouth 2 (Two) Times a Day., Disp: , Rfl:     Diclofenac Sodium (VOLTAREN) 1 % gel gel, Apply 4 g topically to the appropriate area as directed 4 (Four) Times a Day As Needed., Disp: , Rfl:     DULoxetine (CYMBALTA) 60 MG capsule, Take 1 capsule by mouth Daily., Disp: , Rfl:     estradiol (CLIMARA) 0.1 MG/24HR patch, Place 1 patch on the skin as directed by provider 1 (One) Time Per Week., Disp: , Rfl:     gentamicin (GARAMYCIN) 0.3 % ophthalmic solution, 1 drop Every 4 (Four) Hours., Disp: , Rfl:     glipizide (GLUCOTROL) 5 MG tablet, Take 1 tablet by mouth 2 (Two) Times a Day Before Meals., Disp: , Rfl:     hydrOXYzine pamoate (VISTARIL) 25 MG capsule, Take 1 capsule by mouth 3 (Three) Times a Day As Needed for Itching., Disp: , Rfl:     Insulin Glargine w/ Trans Port (Basaglar Tempo Pen) 100 UNIT/ML solution pen-injector, Inject  under the skin into the appropriate area as directed., Disp: , Rfl:     insulin lispro protamine-insulin lispro (humaLOG 50-50) (50-50) 100 UNIT/ML suspension injection, Inject  under the skin into the appropriate area as directed 2 (Two) Times a Day With Meals., Disp: , Rfl:     ketoconazole (NIZORAL) 2 % cream, Apply 1  Application topically to the appropriate area as directed Daily., Disp: , Rfl:     losartan (COZAAR) 50 MG tablet, Take 2 tablets by mouth Daily., Disp: , Rfl:     magnesium oxide (MAG-OX) 400 MG tablet, Take 1 tablet by mouth Daily., Disp: 14 tablet, Rfl: 0    meclizine 25 MG chewable tablet chewable tablet, Chew 0.5 tablets 3 (Three) Times a Day As Needed., Disp: , Rfl:     nitroglycerin (NITRODUR) 0.4 MG/HR patch, Place 1 patch on the skin as directed by provider Daily., Disp: , Rfl:     pantoprazole (PROTONIX) 40 MG EC tablet, Take 1 tablet by mouth Daily., Disp: , Rfl:     Polyethylene Glycol 3350 powder, , Disp: , Rfl:     potassium chloride (K-DUR,KLOR-CON) 20 MEQ CR tablet, Take 1 tablet by mouth 2 (Two) Times a Day., Disp: , Rfl:     promethazine (PHENERGAN) 25 MG tablet, Take 1 tablet by mouth Every 6 (Six) Hours As Needed for Nausea or Vomiting., Disp: , Rfl:     rosuvastatin (CRESTOR) 10 MG tablet, Take 1 tablet by mouth Daily., Disp: , Rfl:     Semaglutide,0.25 or 0.5MG/DOS, (Ozempic, 0.25 or 0.5 MG/DOSE,) 2 MG/1.5ML solution pen-injector, Inject  under the skin into the appropriate area as directed 1 (One) Time Per Week., Disp: , Rfl:     senna (senna) 8.6 MG tablet, Take 1 tablet by mouth Daily., Disp: , Rfl:     tamsulosin (FLOMAX) 0.4 MG capsule 24 hr capsule, Take 1 capsule by mouth Daily., Disp: , Rfl:     torsemide (DEMADEX) 20 MG tablet, Take 1 tablet by mouth Daily., Disp: , Rfl:     traZODone (DESYREL) 50 MG tablet, Take 1 tablet by mouth Every Night., Disp: , Rfl:     triamcinolone (KENALOG) 0.025 % cream, Apply  topically to the appropriate area as directed 2 (Two) Times a Day., Disp: , Rfl:     vitamin B-12 (CYANOCOBALAMIN) 1000 MCG tablet, Take 1 tablet by mouth Daily., Disp: , Rfl:       Review of Systems    Review of Systems   Constitutional:  Negative for activity change, appetite change, chills, diaphoresis, fatigue and fever.   HENT:  Negative for congestion, dental problem, drooling,  "ear discharge, ear pain, facial swelling, postnasal drip, sinus pressure, sore throat and swollen glands.    Eyes:  Negative for blurred vision, double vision, pain, discharge and itching.   Respiratory:  Negative for apnea, cough, choking, chest tightness and shortness of breath.    Cardiovascular:  Negative for chest pain, palpitations and leg swelling.   Gastrointestinal:  Positive for diarrhea. Negative for abdominal distention, abdominal pain, nausea, rectal pain and vomiting.   Genitourinary:  Positive for difficulty urinating, dysuria and frequency. Negative for flank pain, hematuria, pelvic pain and pelvic pressure.   Neurological:  Negative for dizziness, tremors, seizures, syncope, speech difficulty and confusion.   Psychiatric/Behavioral:  Negative for agitation and behavioral problems.         Objective     Vital Signs:  /71 (BP Location: Right arm, Patient Position: Sitting, Cuff Size: Small Adult)   Pulse 74   Ht 165.1 cm (65\")   Wt 80.4 kg (177 lb 3.2 oz)   SpO2 97%   BMI 29.49 kg/m²   Estimated body mass index is 29.49 kg/m² as calculated from the following:    Height as of this encounter: 165.1 cm (65\").    Weight as of this encounter: 80.4 kg (177 lb 3.2 oz).    Physical Exam  Constitutional:       General: She is not in acute distress.     Appearance: Normal appearance. She is not ill-appearing, toxic-appearing or diaphoretic.   HENT:      Head: Normocephalic and atraumatic.      Nose: No congestion or rhinorrhea.      Mouth/Throat:      Pharynx: Oropharynx is clear. No oropharyngeal exudate or posterior oropharyngeal erythema.   Cardiovascular:      Rate and Rhythm: Normal rate and regular rhythm.      Heart sounds: No murmur heard.  Pulmonary:      Effort: No respiratory distress.      Breath sounds: No stridor. No wheezing, rhonchi or rales.   Chest:      Chest wall: No tenderness.   Abdominal:      General: There is no distension.      Tenderness: There is no abdominal tenderness. " "There is no right CVA tenderness, left CVA tenderness, guarding or rebound.   Musculoskeletal:         General: No swelling, tenderness or signs of injury.      Cervical back: No rigidity or tenderness.   Skin:     Coloration: Skin is not jaundiced or pale.      Findings: No bruising, erythema or rash.   Neurological:      General: No focal deficit present.      Mental Status: She is alert. Mental status is at baseline.   Psychiatric:         Mood and Affect: Mood normal.         Behavior: Behavior normal.          Result Review :  The following data was reviewed by Kirstin Katz MD     Lab Results  Lab Results   Component Value Date    WBC 9.83 10/12/2020    HGB 14.0 10/12/2020    HCT 43.7 10/12/2020    MCV 84.5 10/12/2020     10/12/2020     Lab Results   Component Value Date    GLUCOSE 276 (H) 10/12/2020    BUN 27 (H) 10/12/2020    CREATININE 1.39 (H) 10/12/2020    EGFRIFNONA 36 (L) 10/12/2020    BCR 19.4 10/12/2020    K 4.6 10/12/2020    CO2 22.1 10/12/2020    CALCIUM 9.9 10/12/2020    ALBUMIN 4.26 10/12/2020    AST 15 10/12/2020    ALT 13 10/12/2020      Lab Results   Component Value Date    CRP 0.09 10/12/2020        No results found for: \"ACANTHNAEG\", \"AFBCX\", \"BPERTUSSISCX\", \"BLOODCX\"  No results found for: \"BCIDPCR\", \"CXREFLEX\", \"CSFCX\", \"CULTURETIS\"  No results found for: \"CULTURES\", \"HSVCX\", \"URCX\"  No results found for: \"EYECULTURE\", \"GCCX\", \"HSVCULTURE\", \"LABHSV\"  No results found for: \"LEGIONELLA\", \"MRSACX\", \"MUMPSCX\", \"MYCOPLASCX\"  No results found for: \"NOCARDIACX\", \"STOOLCX\"  No results found for: \"THROATCX\", \"UNSTIMCULT\", \"URINECX\", \"CULTURE\", \"VZVCULTUR\"  No results found for: \"VIRALCULTU\", \"WOUNDCX\"    Radiology Results              Assessment / Plan        Diagnoses and all orders for this visit:    1. 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 Recurrent UTI  -     Urinalysis With Microscopic - Urine, Clean Catch; Future  -     Urine Culture - Urine, Urine, Clean Catch; Future    3. Urinary retention  -     Urinalysis With Microscopic - Urine, Clean Catch; Future  -     Urine Culture - Urine, Urine, Clean Catch; Future    4. Venous stasis dermatitis of both lower extremities  -     Urinalysis With Microscopic - Urine, Clean Catch; Future  -     Urine Culture - Urine, Urine, Clean Catch; Future      I believe her problem with recurrent UTIs is rising from her urinary retention and the need for straight cath.     Most recent urine culture showing growth of E. Coli resistant only to Levofloxacin and Bactrim.     She is not self-cathing as often as she is supposed to three times a day. She continues to have significant post void residual.     Has history of venous stasis cellulitis in the past.     Will need to do suppressive therapy after her 2 weeks for Cephalexin and continue with bid dosing for the next 3 months.     Urology follow up as patient continues to have significant retention volumes.     If diarrhea does not resolved by Saturday, will need to check for Cdiff.    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    Is supposed to do straight cath tid but ran out of cath kits. Will call [facility].    Will check UA and Urine culture today.          Follow Up   Return in about 2 weeks (around 4/29/2024) for Follow up, With Dr. Katz.    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 Recurrent UTI  N39.0 599.0   3. Urinary retention  R33.9 788.20   4. Venous stasis dermatitis of both lower extremities  I87.2 454.1       Patient was given instructions and counseling regarding her condition or for health maintenance advice. Please see specific information pulled  into the AVS if appropriate.     This document has been electronically signed by Kirstin Katz MD   April 15, 2024 10:51 EDT    Dictated Utilizing Dragon Dictation: Part of this note may be an electronic transcription/translation of spoken language to printed text using the Dragon Dictation System.

## 2024-04-16 LAB
BACTERIA UR QL AUTO: NORMAL /HPF
BILIRUB UR QL STRIP: NEGATIVE
CLARITY UR: CLEAR
COLOR UR: YELLOW
GLUCOSE UR STRIP-MCNC: NEGATIVE MG/DL
HGB UR QL STRIP.AUTO: NEGATIVE
HYALINE CASTS UR QL AUTO: NORMAL /LPF
KETONES UR QL STRIP: NEGATIVE
LEUKOCYTE ESTERASE UR QL STRIP.AUTO: NEGATIVE
NITRITE UR QL STRIP: NEGATIVE
PH UR STRIP.AUTO: 5.5 [PH] (ref 5–8)
PROT UR QL STRIP: NEGATIVE
RBC # UR STRIP: NORMAL /HPF
REF LAB TEST METHOD: NORMAL
SP GR UR STRIP: 1.01 (ref 1–1.03)
SQUAMOUS #/AREA URNS HPF: NORMAL /HPF
UROBILINOGEN UR QL STRIP: NORMAL
WBC # UR STRIP: NORMAL /HPF

## 2024-04-17 LAB — BACTERIA SPEC AEROBE CULT: NO GROWTH

## 2025-02-27 DIAGNOSIS — M25.561 PAIN IN BOTH KNEES, UNSPECIFIED CHRONICITY: Primary | ICD-10-CM

## 2025-02-27 DIAGNOSIS — M25.562 PAIN IN BOTH KNEES, UNSPECIFIED CHRONICITY: Primary | ICD-10-CM

## 2025-03-12 ENCOUNTER — OFFICE VISIT (OUTPATIENT)
Dept: ORTHOPEDIC SURGERY | Facility: CLINIC | Age: 85
End: 2025-03-12
Payer: MEDICARE

## 2025-03-12 ENCOUNTER — HOSPITAL ENCOUNTER (OUTPATIENT)
Dept: GENERAL RADIOLOGY | Facility: HOSPITAL | Age: 85
Discharge: HOME | End: 2025-03-12
Admitting: ORTHOPAEDIC SURGERY
Payer: MEDICARE

## 2025-03-12 VITALS
HEIGHT: 65 IN | SYSTOLIC BLOOD PRESSURE: 137 MMHG | DIASTOLIC BLOOD PRESSURE: 72 MMHG | WEIGHT: 181 LBS | BODY MASS INDEX: 30.16 KG/M2 | HEART RATE: 74 BPM

## 2025-03-12 DIAGNOSIS — M25.562 PAIN IN BOTH KNEES, UNSPECIFIED CHRONICITY: ICD-10-CM

## 2025-03-12 DIAGNOSIS — M17.0 PRIMARY OSTEOARTHRITIS OF BOTH KNEES: Primary | ICD-10-CM

## 2025-03-12 DIAGNOSIS — M25.561 PAIN IN BOTH KNEES, UNSPECIFIED CHRONICITY: ICD-10-CM

## 2025-03-12 PROCEDURE — 73562 X-RAY EXAM OF KNEE 3: CPT

## 2025-03-12 RX ORDER — BUMETANIDE 2 MG/1
2 TABLET ORAL DAILY
COMMUNITY

## 2025-03-12 RX ORDER — LIDOCAINE 50 MG/G
1 PATCH TOPICAL EVERY 24 HOURS
COMMUNITY

## 2025-03-12 RX ORDER — NITROGLYCERIN 0.4 MG/1
0.4 TABLET SUBLINGUAL
COMMUNITY

## 2025-03-12 RX ORDER — SODIUM BICARBONATE 650 MG/1
650 TABLET ORAL 4 TIMES DAILY
COMMUNITY

## 2025-03-12 RX ADMIN — LIDOCAINE HYDROCHLORIDE 5 ML: 10 INJECTION, SOLUTION EPIDURAL; INFILTRATION; INTRACAUDAL; PERINEURAL at 08:04

## 2025-03-12 RX ADMIN — LIDOCAINE HYDROCHLORIDE 5 ML: 10 INJECTION, SOLUTION EPIDURAL; INFILTRATION; INTRACAUDAL; PERINEURAL at 08:05

## 2025-03-12 NOTE — PROGRESS NOTES
Follow-up Visit         Patient: Maggie Perez  YOB: 1940  Date of Encounter: 03/12/2025      Chief  Complaint:   Chief Complaint   Patient presents with    Right Knee - Initial Evaluation, Pain    Left Knee - Initial Evaluation, Pain         HPI:  Maggie Perez, 84 y.o. female presents in follow-up        Medical History:  Patient Active Problem List   Diagnosis    Extended spectrum beta lactamase (ESBL) resistance    Recurrent UTI    Urinary retention    Venous stasis dermatitis of both lower extremities     Past Medical History:   Diagnosis Date    Asthma     CHF (congestive heart failure)     Coronary artery disease     Diabetes     GERD (gastroesophageal reflux disease)     Gout     Hyperlipidemia     Hypertension     Osteoarthritis            Social History:  Social History     Socioeconomic History    Marital status:    Tobacco Use    Smoking status: Never    Smokeless tobacco: Never   Vaping Use    Vaping status: Never Used   Substance and Sexual Activity    Alcohol use: Never    Drug use: Never    Sexual activity: Defer           Current Medications:    Current Outpatient Medications:     acetaminophen (TYLENOL) 500 MG tablet, Take 1 tablet by mouth Every 6 (Six) Hours As Needed for Mild Pain., Disp: , Rfl:     amLODIPine (NORVASC) 10 MG tablet, Take 1 tablet by mouth Daily., Disp: , Rfl:     apixaban (ELIQUIS) 2.5 MG tablet tablet, Take 1 tablet by mouth 2 (Two) Times a Day., Disp: , Rfl:     ascorbic acid (VITAMIN C) 500 MG tablet, Take 1 tablet by mouth Daily., Disp: , Rfl:     bumetanide (BUMEX) 2 MG tablet, Take 1 tablet by mouth Daily., Disp: , Rfl:     carvedilol (COREG) 25 MG tablet, Take 1 tablet by mouth 2 (Two) Times a Day With Meals., Disp: , Rfl:     cholecalciferol (VITAMIN D3) 25 MCG (1000 UT) tablet, Take 1 tablet by mouth Daily., Disp: , Rfl:     DULoxetine (CYMBALTA) 60 MG capsule, Take 1 capsule by mouth Daily., Disp: , Rfl:     estradiol (CLIMARA) 0.1 MG/24HR  patch, Place 1 patch on the skin as directed by provider 1 (One) Time Per Week., Disp: , Rfl:     glipizide (GLUCOTROL) 5 MG tablet, Take 1 tablet by mouth 2 (Two) Times a Day Before Meals., Disp: , Rfl:     Glucagon 0.5 MG/0.1ML solution auto-injector, Inject  under the skin into the appropriate area as directed., Disp: , Rfl:     Insulin Glargine w/ Trans Port (Basaglar Tempo Pen) 100 UNIT/ML solution pen-injector, Inject  under the skin into the appropriate area as directed., Disp: , Rfl:     ketoconazole (NIZORAL) 2 % cream, Apply 1 Application topically to the appropriate area as directed Daily., Disp: , Rfl:     lidocaine (LIDODERM) 5 %, Place 1 patch on the skin as directed by provider Daily. Remove & Discard patch within 12 hours or as directed by MD, Disp: , Rfl:     losartan (COZAAR) 50 MG tablet, Take 2 tablets by mouth Daily., Disp: , Rfl:     magnesium oxide (MAG-OX) 400 MG tablet, Take 1 tablet by mouth Daily., Disp: 14 tablet, Rfl: 0    meclizine 25 MG chewable tablet chewable tablet, Chew 0.5 tablets 3 (Three) Times a Day As Needed., Disp: , Rfl:     nitroglycerin (NITROSTAT) 0.4 MG SL tablet, Place 1 tablet under the tongue Every 5 (Five) Minutes As Needed for Chest Pain. Take no more than 3 doses in 15 minutes., Disp: , Rfl:     pantoprazole (PROTONIX) 40 MG EC tablet, Take 1 tablet by mouth Daily., Disp: , Rfl:     potassium chloride (K-DUR,KLOR-CON) 20 MEQ CR tablet, Take 1 tablet by mouth 2 (Two) Times a Day., Disp: , Rfl:     rosuvastatin (CRESTOR) 10 MG tablet, Take 1 tablet by mouth Daily., Disp: , Rfl:     Semaglutide,0.25 or 0.5MG/DOS, (Ozempic, 0.25 or 0.5 MG/DOSE,) 2 MG/1.5ML solution pen-injector, Inject  under the skin into the appropriate area as directed 1 (One) Time Per Week., Disp: , Rfl:     senna (senna) 8.6 MG tablet, Take 1 tablet by mouth Daily., Disp: , Rfl:     sodium bicarbonate 650 MG tablet, Take 1 tablet by mouth 4 (Four) Times a Day., Disp: , Rfl:     traZODone (DESYREL)  50 MG tablet, Take 1 tablet by mouth Every Night., Disp: , Rfl:     triamcinolone (KENALOG) 0.025 % cream, Apply  topically to the appropriate area as directed 2 (Two) Times a Day., Disp: , Rfl:     vitamin B-12 (CYANOCOBALAMIN) 1000 MCG tablet, Take 1 tablet by mouth Daily., Disp: , Rfl:     albuterol (PROVENTIL) (2.5 MG/3ML) 0.083% nebulizer solution, Take 2.5 mg by nebulization Every 4 (Four) Hours As Needed for Wheezing. (Patient not taking: Reported on 3/12/2025), Disp: , Rfl:     cetirizine (zyrTEC) 10 MG tablet, Take 1 tablet by mouth Daily. (Patient not taking: Reported on 3/12/2025), Disp: , Rfl:     ciprofloxacin (CIPRO) 250 MG tablet, Take 1 tablet by mouth 2 (Two) Times a Day. (Patient not taking: Reported on 3/12/2025), Disp: , Rfl:     Diclofenac Sodium (VOLTAREN) 1 % gel gel, Apply 4 g topically to the appropriate area as directed 4 (Four) Times a Day As Needed. (Patient not taking: Reported on 3/12/2025), Disp: , Rfl:     gentamicin (GARAMYCIN) 0.3 % ophthalmic solution, 1 drop Every 4 (Four) Hours. (Patient not taking: Reported on 3/12/2025), Disp: , Rfl:     hydrOXYzine pamoate (VISTARIL) 25 MG capsule, Take 1 capsule by mouth 3 (Three) Times a Day As Needed for Itching. (Patient not taking: Reported on 3/12/2025), Disp: , Rfl:     insulin lispro protamine-insulin lispro (humaLOG 50-50) (50-50) 100 UNIT/ML suspension injection, Inject  under the skin into the appropriate area as directed 2 (Two) Times a Day With Meals. (Patient not taking: Reported on 3/12/2025), Disp: , Rfl:     nitroglycerin (NITRODUR) 0.4 MG/HR patch, Place 1 patch on the skin as directed by provider Daily., Disp: , Rfl:     Polyethylene Glycol 3350 powder, , Disp: , Rfl:     promethazine (PHENERGAN) 25 MG tablet, Take 1 tablet by mouth Every 6 (Six) Hours As Needed for Nausea or Vomiting. (Patient not taking: Reported on 3/12/2025), Disp: , Rfl:     tamsulosin (FLOMAX) 0.4 MG capsule 24 hr capsule, Take 1 capsule by mouth  "Daily. (Patient not taking: Reported on 3/12/2025), Disp: , Rfl:     torsemide (DEMADEX) 20 MG tablet, Take 1 tablet by mouth Daily. (Patient not taking: Reported on 3/12/2025), Disp: , Rfl:         Allergies:  Allergies   Allergen Reactions    Amoxicillin GI Intolerance    Augmentin [Amoxicillin-Pot Clavulanate] Rash    Hydroxychloroquine Sulfate GI Intolerance    Lactulose GI Intolerance    Nsaids GI Intolerance    Penicillins Rash    Potassium Clavulanate [Clavulanic Acid] Rash           Family History:  Family History   Problem Relation Age of Onset    Diabetes Mother     Heart disease Mother     Heart disease Father     Cancer Brother            Surgical History:  Past Surgical History:   Procedure Laterality Date    CARPAL TUNNEL RELEASE Bilateral     CATARACT EXTRACTION      CHOLECYSTECTOMY      HYSTERECTOMY      PERIPHERAL ARTERIAL STENT GRAFT           Vitals:  Vitals:    03/12/25 0818   BP: 137/72   Pulse: 74   Weight: 82.1 kg (181 lb)   Height: 165.1 cm (65\")     Body mass index is 30.12 kg/m².        Radiology:   XR Knee 3 View Bilateral  Result Date: 3/12/2025  1.  No acute fracture or dislocation. 2.  Chondrocalcinosis. 3.  Joint space narrowing diffusely.  This report was finalized on 3/12/2025 8:16 AM by Dr. Robert Espana MD.            Radiographs          Orthopedic Examination:          Assessment & Plan:   84 y.o. female presents           No diagnosis found.      Procedures        Cc:  Christian Mckeon DO              This document has been electronically signed by Glenn Solo MD   March 12, 2025 08:51 EDT  "

## 2025-03-12 NOTE — PROGRESS NOTES
New Patient Visit      Patient: Maggie Perez  YOB: 1940  Date of Encounter: 03/12/2025      Chief Complaint:   Chief Complaint   Patient presents with    Right Knee - Initial Evaluation, Pain    Left Knee - Initial Evaluation, Pain           HPI:   Maggie Perez, 84 y.o. female, presents for relation of bilateral knee pain.  She has received numerous injections to both knees with fairly good response.  Similarly no longer providing injections.  She describes bilateral knee pain of many years.  She has not experienced trauma denies giving way or locking to either knee and denies weakness or numbness to her lower extremities.  Her past medical history is markable for venous stasis dermatitis of both lower extremities she also gives history of CHF coronary artery disease diabetes and gout.        Active Problem List:  Patient Active Problem List   Diagnosis    Extended spectrum beta lactamase (ESBL) resistance    Recurrent UTI    Urinary retention    Venous stasis dermatitis of both lower extremities           Past Medical History:  Past Medical History:   Diagnosis Date    Asthma     CHF (congestive heart failure)     Coronary artery disease     Diabetes     GERD (gastroesophageal reflux disease)     Gout     Hyperlipidemia     Hypertension     Osteoarthritis            Past Surgical History:  Past Surgical History:   Procedure Laterality Date    CARPAL TUNNEL RELEASE Bilateral     CATARACT EXTRACTION      CHOLECYSTECTOMY      HYSTERECTOMY      PERIPHERAL ARTERIAL STENT GRAFT             Family History:  Family History   Problem Relation Age of Onset    Diabetes Mother     Heart disease Mother     Heart disease Father     Cancer Brother            Social History:  Social History     Socioeconomic History    Marital status:    Tobacco Use    Smoking status: Never    Smokeless tobacco: Never   Vaping Use    Vaping status: Never Used   Substance and Sexual Activity    Alcohol use: Never    Drug use:  Never    Sexual activity: Defer     Body mass index is 30.12 kg/m².        Medications:  Current Outpatient Medications   Medication Sig Dispense Refill    acetaminophen (TYLENOL) 500 MG tablet Take 1 tablet by mouth Every 6 (Six) Hours As Needed for Mild Pain.      amLODIPine (NORVASC) 10 MG tablet Take 1 tablet by mouth Daily.      apixaban (ELIQUIS) 2.5 MG tablet tablet Take 1 tablet by mouth 2 (Two) Times a Day.      ascorbic acid (VITAMIN C) 500 MG tablet Take 1 tablet by mouth Daily.      bumetanide (BUMEX) 2 MG tablet Take 1 tablet by mouth Daily.      carvedilol (COREG) 25 MG tablet Take 1 tablet by mouth 2 (Two) Times a Day With Meals.      cholecalciferol (VITAMIN D3) 25 MCG (1000 UT) tablet Take 1 tablet by mouth Daily.      DULoxetine (CYMBALTA) 60 MG capsule Take 1 capsule by mouth Daily.      estradiol (CLIMARA) 0.1 MG/24HR patch Place 1 patch on the skin as directed by provider 1 (One) Time Per Week.      glipizide (GLUCOTROL) 5 MG tablet Take 1 tablet by mouth 2 (Two) Times a Day Before Meals.      Glucagon 0.5 MG/0.1ML solution auto-injector Inject  under the skin into the appropriate area as directed.      Insulin Glargine w/ Trans Port (Basaglar Tempo Pen) 100 UNIT/ML solution pen-injector Inject  under the skin into the appropriate area as directed.      ketoconazole (NIZORAL) 2 % cream Apply 1 Application topically to the appropriate area as directed Daily.      lidocaine (LIDODERM) 5 % Place 1 patch on the skin as directed by provider Daily. Remove & Discard patch within 12 hours or as directed by MD      losartan (COZAAR) 50 MG tablet Take 2 tablets by mouth Daily.      magnesium oxide (MAG-OX) 400 MG tablet Take 1 tablet by mouth Daily. 14 tablet 0    meclizine 25 MG chewable tablet chewable tablet Chew 0.5 tablets 3 (Three) Times a Day As Needed.      nitroglycerin (NITROSTAT) 0.4 MG SL tablet Place 1 tablet under the tongue Every 5 (Five) Minutes As Needed for Chest Pain. Take no more than  3 doses in 15 minutes.      pantoprazole (PROTONIX) 40 MG EC tablet Take 1 tablet by mouth Daily.      potassium chloride (K-DUR,KLOR-CON) 20 MEQ CR tablet Take 1 tablet by mouth 2 (Two) Times a Day.      rosuvastatin (CRESTOR) 10 MG tablet Take 1 tablet by mouth Daily.      Semaglutide,0.25 or 0.5MG/DOS, (Ozempic, 0.25 or 0.5 MG/DOSE,) 2 MG/1.5ML solution pen-injector Inject  under the skin into the appropriate area as directed 1 (One) Time Per Week.      senna (senna) 8.6 MG tablet Take 1 tablet by mouth Daily.      sodium bicarbonate 650 MG tablet Take 1 tablet by mouth 4 (Four) Times a Day.      traZODone (DESYREL) 50 MG tablet Take 1 tablet by mouth Every Night.      triamcinolone (KENALOG) 0.025 % cream Apply  topically to the appropriate area as directed 2 (Two) Times a Day.      vitamin B-12 (CYANOCOBALAMIN) 1000 MCG tablet Take 1 tablet by mouth Daily.      albuterol (PROVENTIL) (2.5 MG/3ML) 0.083% nebulizer solution Take 2.5 mg by nebulization Every 4 (Four) Hours As Needed for Wheezing. (Patient not taking: Reported on 3/12/2025)      cetirizine (zyrTEC) 10 MG tablet Take 1 tablet by mouth Daily. (Patient not taking: Reported on 3/12/2025)      ciprofloxacin (CIPRO) 250 MG tablet Take 1 tablet by mouth 2 (Two) Times a Day. (Patient not taking: Reported on 3/12/2025)      Diclofenac Sodium (VOLTAREN) 1 % gel gel Apply 4 g topically to the appropriate area as directed 4 (Four) Times a Day As Needed. (Patient not taking: Reported on 3/12/2025)      gentamicin (GARAMYCIN) 0.3 % ophthalmic solution 1 drop Every 4 (Four) Hours. (Patient not taking: Reported on 3/12/2025)      hydrOXYzine pamoate (VISTARIL) 25 MG capsule Take 1 capsule by mouth 3 (Three) Times a Day As Needed for Itching. (Patient not taking: Reported on 3/12/2025)      insulin lispro protamine-insulin lispro (humaLOG 50-50) (50-50) 100 UNIT/ML suspension injection Inject  under the skin into the appropriate area as directed 2 (Two) Times a Day  With Meals. (Patient not taking: Reported on 3/12/2025)      nitroglycerin (NITRODUR) 0.4 MG/HR patch Place 1 patch on the skin as directed by provider Daily.      Polyethylene Glycol 3350 powder  (Patient not taking: Reported on 3/12/2025)      promethazine (PHENERGAN) 25 MG tablet Take 1 tablet by mouth Every 6 (Six) Hours As Needed for Nausea or Vomiting. (Patient not taking: Reported on 3/12/2025)      tamsulosin (FLOMAX) 0.4 MG capsule 24 hr capsule Take 1 capsule by mouth Daily. (Patient not taking: Reported on 3/12/2025)      torsemide (DEMADEX) 20 MG tablet Take 1 tablet by mouth Daily. (Patient not taking: Reported on 3/12/2025)       No current facility-administered medications for this visit.           Allergies:  Allergies   Allergen Reactions    Amoxicillin GI Intolerance    Augmentin [Amoxicillin-Pot Clavulanate] Rash    Hydroxychloroquine Sulfate GI Intolerance    Lactulose GI Intolerance    Nsaids GI Intolerance    Penicillins Rash    Potassium Clavulanate [Clavulanic Acid] Rash           Review of Systems:   Review of Systems   Constitutional:  Negative for chills, fatigue and fever.   HENT:  Positive for hearing loss. Negative for congestion, ear pain, facial swelling, sore throat, trouble swallowing and voice change.    Eyes:  Negative for pain, discharge, redness and visual disturbance.   Respiratory:  Positive for apnea. Negative for cough, choking, chest tightness, shortness of breath, wheezing and stridor.    Cardiovascular:  Positive for leg swelling. Negative for chest pain and palpitations.   Gastrointestinal: Negative.  Negative for abdominal distention, abdominal pain, blood in stool, nausea and vomiting.   Endocrine: Negative.  Negative for cold intolerance, heat intolerance, polydipsia and polyphagia.   Genitourinary: Negative.  Negative for difficulty urinating, dysuria, flank pain, frequency and hematuria.   Musculoskeletal:  Positive for arthralgias, back pain, joint swelling and  "neck pain.   Skin: Negative.  Negative for color change, pallor, rash and wound.   Allergic/Immunologic: Negative.  Negative for environmental allergies, food allergies and immunocompromised state.   Neurological:  Positive for weakness. Negative for dizziness, tremors, seizures, syncope, speech difficulty, light-headedness, numbness and headaches.   Hematological: Negative.  Negative for adenopathy. Does not bruise/bleed easily.   Psychiatric/Behavioral:  Positive for dysphoric mood. Negative for behavioral problems, confusion, self-injury, sleep disturbance and suicidal ideas. The patient is nervous/anxious.            Physical Exam:   Physical Exam  GENERAL: 84 y.o. female, alert and oriented X 3 in no acute distress.   Visit Vitals  /72   Pulse 74   Ht 165.1 cm (65\")   Wt 82.1 kg (181 lb)   BMI 30.12 kg/m²       Musculoskeletal Examination:   Bilateral knees reveals minimal effusion with moderate medial joint line tenderness greater than lateral joint line tenderness she demonstrates full extension to both knee has flexion beyond 90 degrees demonstrates no gross instability with varus valgus stressing Lachman or drawer patella with normal tracking bilaterally and normal neurovascular status.          Radiology/Labs:    XR Knee 3 View Bilateral  Result Date: 3/12/2025  1.  No acute fracture or dislocation. 2.  Chondrocalcinosis. 3.  Joint space narrowing diffusely.  This report was finalized on 3/12/2025 8:16 AM by Dr. Robert Espana MD.            Radiographs bilateral knees shows moderate osteoarthritis with joint space narrowing calcification within the menisci.        Assessment & Plan:   84 y.o. female presents known osteoarthritis bilateral knees demonstrated by radiographs.  She has received viscous injections in the past with good response today she is provided Monovisc intra-articular with lidocaine block bilateral knees she will return as needed.      ICD-10-CM ICD-9-CM   1. Primary osteoarthritis " of both knees  M17.0 715.16     Large Joint Arthrocentesis: R knee  Date/Time: 3/12/2025 8:04 AM  Consent given by: patient  Site marked: site marked  Timeout: Immediately prior to procedure a time out was called to verify the correct patient, procedure, equipment, support staff and site/side marked as required   Supporting Documentation  Indications: pain   Procedure Details  Location: knee - R knee  Preparation: Patient was prepped and draped in the usual sterile fashion  Needle size: 20 G  Approach: anterolateral  Medications administered: 88 mg Hyaluronan 88 MG/4ML; 5 mL lidocaine PF 1% 1 %  Patient tolerance: patient tolerated the procedure well with no immediate complications      Large Joint Arthrocentesis: L knee  Date/Time: 3/12/2025 8:05 AM  Consent given by: patient  Site marked: site marked  Timeout: Immediately prior to procedure a time out was called to verify the correct patient, procedure, equipment, support staff and site/side marked as required   Supporting Documentation  Indications: pain   Procedure Details  Location: knee - L knee  Preparation: Patient was prepped and draped in the usual sterile fashion  Needle size: 20 G  Approach: anterolateral  Medications administered: 88 mg Hyaluronan 88 MG/4ML; 5 mL lidocaine PF 1% 1 %  Patient tolerance: patient tolerated the procedure well with no immediate complications          Cc:   Christian Mckeon DO              This document has been electronically signed by Glenn Solo MD   March 13, 2025 08:04 EDT

## 2025-03-13 ENCOUNTER — PATIENT ROUNDING (BHMG ONLY) (OUTPATIENT)
Dept: ORTHOPEDIC SURGERY | Facility: CLINIC | Age: 85
End: 2025-03-13
Payer: MEDICARE

## 2025-03-13 NOTE — PROGRESS NOTES
March 13, 2025    Hello, may I speak with Maggie Perez?    My name is Sarah GONZÁLES,      I am  with [facility] [place]  [facility]  [address]  [place] [address] [phone].    Before we get started may I verify your date of birth? 1940    I am calling to officially welcome you to our practice and ask about your recent visit. Is this a good time to talk? yes    Tell me about your visit with us. What things went well?  Everything went well.        We're always looking for ways to make our patients' experiences even better. Do you have recommendations on ways we may improve?  no    Overall were you satisfied with your first visit to our practice? yes       I appreciate you taking the time to speak with me today. Is there anything else I can do for you? no      Thank you, and have a great day.

## 2025-03-14 RX ORDER — LIDOCAINE HYDROCHLORIDE 10 MG/ML
5 INJECTION, SOLUTION EPIDURAL; INFILTRATION; INTRACAUDAL; PERINEURAL
Status: COMPLETED | OUTPATIENT
Start: 2025-03-12 | End: 2025-03-12

## 2025-08-18 ENCOUNTER — OFFICE VISIT (OUTPATIENT)
Dept: CARDIOLOGY | Facility: CLINIC | Age: 85
End: 2025-08-18
Payer: MEDICARE

## 2025-08-18 ENCOUNTER — LAB (OUTPATIENT)
Dept: LAB | Facility: HOSPITAL | Age: 85
End: 2025-08-18
Payer: MEDICARE

## 2025-08-18 VITALS
WEIGHT: 183 LBS | HEART RATE: 69 BPM | OXYGEN SATURATION: 96 % | BODY MASS INDEX: 30.49 KG/M2 | DIASTOLIC BLOOD PRESSURE: 70 MMHG | SYSTOLIC BLOOD PRESSURE: 140 MMHG | HEIGHT: 65 IN

## 2025-08-18 DIAGNOSIS — R07.9 CHRONIC CHEST PAIN WITH LOW TO MODERATE RISK FOR CAD: ICD-10-CM

## 2025-08-18 DIAGNOSIS — E78.5 HYPERLIPIDEMIA LDL GOAL <70: ICD-10-CM

## 2025-08-18 DIAGNOSIS — R06.09 DYSPNEA ON EXERTION: Primary | ICD-10-CM

## 2025-08-18 DIAGNOSIS — N18.32 STAGE 3B CHRONIC KIDNEY DISEASE: ICD-10-CM

## 2025-08-18 DIAGNOSIS — R06.09 DYSPNEA ON EXERTION: ICD-10-CM

## 2025-08-18 DIAGNOSIS — Z79.01 CHRONIC ANTICOAGULATION: ICD-10-CM

## 2025-08-18 DIAGNOSIS — I48.0 PAROXYSMAL ATRIAL FIBRILLATION: ICD-10-CM

## 2025-08-18 DIAGNOSIS — G89.29 CHRONIC CHEST PAIN WITH LOW TO MODERATE RISK FOR CAD: ICD-10-CM

## 2025-08-18 DIAGNOSIS — Z91.89 CHRONIC CHEST PAIN WITH LOW TO MODERATE RISK FOR CAD: ICD-10-CM

## 2025-08-18 DIAGNOSIS — I10 ESSENTIAL HYPERTENSION: ICD-10-CM

## 2025-08-18 PROBLEM — N18.9 CHRONIC KIDNEY DISEASE: Status: ACTIVE | Noted: 2025-08-18

## 2025-08-18 PROCEDURE — 83880 ASSAY OF NATRIURETIC PEPTIDE: CPT

## 2025-08-18 PROCEDURE — 85025 COMPLETE CBC W/AUTO DIFF WBC: CPT

## 2025-08-18 PROCEDURE — 82550 ASSAY OF CK (CPK): CPT

## 2025-08-18 PROCEDURE — 99204 OFFICE O/P NEW MOD 45 MIN: CPT | Performed by: INTERNAL MEDICINE

## 2025-08-18 PROCEDURE — 80061 LIPID PANEL: CPT

## 2025-08-18 PROCEDURE — 80053 COMPREHEN METABOLIC PANEL: CPT

## 2025-08-18 PROCEDURE — 36415 COLL VENOUS BLD VENIPUNCTURE: CPT

## 2025-08-18 PROCEDURE — 93000 ELECTROCARDIOGRAM COMPLETE: CPT | Performed by: INTERNAL MEDICINE

## 2025-08-19 ENCOUNTER — RESULTS FOLLOW-UP (OUTPATIENT)
Dept: LAB | Facility: HOSPITAL | Age: 85
End: 2025-08-19
Payer: MEDICARE

## 2025-08-19 LAB
ALBUMIN SERPL-MCNC: 3.6 G/DL (ref 3.5–5.2)
ALBUMIN/GLOB SERPL: 1.3 G/DL
ALP SERPL-CCNC: 106 U/L (ref 39–117)
ALT SERPL W P-5'-P-CCNC: 8 U/L (ref 1–33)
ANION GAP SERPL CALCULATED.3IONS-SCNC: 12 MMOL/L (ref 5–15)
AST SERPL-CCNC: 18 U/L (ref 1–32)
BASOPHILS # BLD AUTO: 0.05 10*3/MM3 (ref 0–0.2)
BASOPHILS NFR BLD AUTO: 0.8 % (ref 0–1.5)
BILIRUB SERPL-MCNC: 0.3 MG/DL (ref 0–1.2)
BUN SERPL-MCNC: 21 MG/DL (ref 8–23)
BUN/CREAT SERPL: 18.4 (ref 7–25)
CALCIUM SPEC-SCNC: 8.9 MG/DL (ref 8.6–10.5)
CHLORIDE SERPL-SCNC: 106 MMOL/L (ref 98–107)
CHOLEST SERPL-MCNC: 97 MG/DL (ref 0–200)
CK SERPL-CCNC: 42 U/L (ref 20–180)
CO2 SERPL-SCNC: 22 MMOL/L (ref 22–29)
CREAT SERPL-MCNC: 1.14 MG/DL (ref 0.57–1)
DEPRECATED RDW RBC AUTO: 40.9 FL (ref 37–54)
EGFRCR SERPLBLD CKD-EPI 2021: 47 ML/MIN/1.73
EOSINOPHIL # BLD AUTO: 0.08 10*3/MM3 (ref 0–0.4)
EOSINOPHIL NFR BLD AUTO: 1.3 % (ref 0.3–6.2)
ERYTHROCYTE [DISTWIDTH] IN BLOOD BY AUTOMATED COUNT: 13 % (ref 12.3–15.4)
GLOBULIN UR ELPH-MCNC: 2.8 GM/DL
GLUCOSE SERPL-MCNC: 197 MG/DL (ref 65–99)
HCT VFR BLD AUTO: 36.2 % (ref 34–46.6)
HDLC SERPL-MCNC: 43 MG/DL (ref 40–60)
HGB BLD-MCNC: 11.1 G/DL (ref 12–15.9)
IMM GRANULOCYTES # BLD AUTO: 0.01 10*3/MM3 (ref 0–0.05)
IMM GRANULOCYTES NFR BLD AUTO: 0.2 % (ref 0–0.5)
LDLC SERPL CALC-MCNC: 34 MG/DL (ref 0–100)
LDLC/HDLC SERPL: 0.75 {RATIO}
LYMPHOCYTES # BLD AUTO: 1.58 10*3/MM3 (ref 0.7–3.1)
LYMPHOCYTES NFR BLD AUTO: 24.8 % (ref 19.6–45.3)
MCH RBC QN AUTO: 26.7 PG (ref 26.6–33)
MCHC RBC AUTO-ENTMCNC: 30.7 G/DL (ref 31.5–35.7)
MCV RBC AUTO: 87.2 FL (ref 79–97)
MONOCYTES # BLD AUTO: 0.58 10*3/MM3 (ref 0.1–0.9)
MONOCYTES NFR BLD AUTO: 9.1 % (ref 5–12)
NEUTROPHILS NFR BLD AUTO: 4.06 10*3/MM3 (ref 1.7–7)
NEUTROPHILS NFR BLD AUTO: 63.8 % (ref 42.7–76)
NRBC BLD AUTO-RTO: 0 /100 WBC (ref 0–0.2)
NT-PROBNP SERPL-MCNC: 912 PG/ML (ref 0–1800)
PLATELET # BLD AUTO: 206 10*3/MM3 (ref 140–450)
PMV BLD AUTO: 12.4 FL (ref 6–12)
POTASSIUM SERPL-SCNC: 4 MMOL/L (ref 3.5–5.2)
PROT SERPL-MCNC: 6.4 G/DL (ref 6–8.5)
RBC # BLD AUTO: 4.15 10*6/MM3 (ref 3.77–5.28)
SODIUM SERPL-SCNC: 140 MMOL/L (ref 136–145)
TRIGL SERPL-MCNC: 109 MG/DL (ref 0–150)
VLDLC SERPL-MCNC: 20 MG/DL (ref 5–40)
WBC NRBC COR # BLD AUTO: 6.36 10*3/MM3 (ref 3.4–10.8)

## 2025-08-28 ENCOUNTER — HOSPITAL ENCOUNTER (OUTPATIENT)
Dept: NUCLEAR MEDICINE | Facility: HOSPITAL | Age: 85
Discharge: HOME | End: 2025-08-28
Payer: MEDICARE

## 2025-08-28 ENCOUNTER — HOSPITAL ENCOUNTER (OUTPATIENT)
Dept: CARDIOLOGY | Facility: HOSPITAL | Age: 85
Discharge: HOME | End: 2025-08-28
Payer: MEDICARE

## 2025-08-28 DIAGNOSIS — Z91.89 CHRONIC CHEST PAIN WITH LOW TO MODERATE RISK FOR CAD: ICD-10-CM

## 2025-08-28 DIAGNOSIS — R06.09 DYSPNEA ON EXERTION: ICD-10-CM

## 2025-08-28 DIAGNOSIS — R07.9 CHRONIC CHEST PAIN WITH LOW TO MODERATE RISK FOR CAD: ICD-10-CM

## 2025-08-28 DIAGNOSIS — G89.29 CHRONIC CHEST PAIN WITH LOW TO MODERATE RISK FOR CAD: ICD-10-CM

## 2025-08-28 LAB
AORTIC DIMENSIONLESS INDEX: 0.52 (DI)
AV MEAN PRESS GRAD SYS DOP V1V2: 4 MMHG
AV VMAX SYS DOP: 135 CM/SEC
BH CV ECHO MEAS - ACS: 1.4 CM
BH CV ECHO MEAS - AI P1/2T: 475 MSEC
BH CV ECHO MEAS - AO MAX PG: 7.3 MMHG
BH CV ECHO MEAS - AO ROOT DIAM: 3 CM
BH CV ECHO MEAS - AO V2 VTI: 36.4 CM
BH CV ECHO MEAS - AVA(I,D): 1.49 CM2
BH CV ECHO MEAS - EDV(CUBED): 103.8 ML
BH CV ECHO MEAS - EDV(MOD-SP4): 67.4 ML
BH CV ECHO MEAS - EF(MOD-SP4): 63.2 %
BH CV ECHO MEAS - ESV(CUBED): 42.9 ML
BH CV ECHO MEAS - ESV(MOD-SP4): 24.8 ML
BH CV ECHO MEAS - FS: 25.5 %
BH CV ECHO MEAS - IVS/LVPW: 0.92 CM
BH CV ECHO MEAS - IVSD: 1.1 CM
BH CV ECHO MEAS - LA DIMENSION: 3.3 CM
BH CV ECHO MEAS - LAT PEAK E' VEL: 7 CM/SEC
BH CV ECHO MEAS - LV DIASTOLIC VOL/BSA (35-75): 35.4 CM2
BH CV ECHO MEAS - LV MASS(C)D: 199.6 GRAMS
BH CV ECHO MEAS - LV MAX PG: 1.58 MMHG
BH CV ECHO MEAS - LV MEAN PG: 1 MMHG
BH CV ECHO MEAS - LV SYSTOLIC VOL/BSA (12-30): 13 CM2
BH CV ECHO MEAS - LV V1 MAX: 62.9 CM/SEC
BH CV ECHO MEAS - LV V1 VTI: 19.1 CM
BH CV ECHO MEAS - LVIDD: 4.7 CM
BH CV ECHO MEAS - LVIDS: 3.5 CM
BH CV ECHO MEAS - LVOT AREA: 2.8 CM2
BH CV ECHO MEAS - LVOT DIAM: 1.9 CM
BH CV ECHO MEAS - LVPWD: 1.2 CM
BH CV ECHO MEAS - MED PEAK E' VEL: 5.1 CM/SEC
BH CV ECHO MEAS - MR MAX PG: 78.5 MMHG
BH CV ECHO MEAS - MR MAX VEL: 443 CM/SEC
BH CV ECHO MEAS - MV A MAX VEL: 73.1 CM/SEC
BH CV ECHO MEAS - MV DEC SLOPE: 856 CM/SEC2
BH CV ECHO MEAS - MV E MAX VEL: 136 CM/SEC
BH CV ECHO MEAS - MV E/A: 1.86
BH CV ECHO MEAS - MV MAX PG: 9.6 MMHG
BH CV ECHO MEAS - MV MEAN PG: 3 MMHG
BH CV ECHO MEAS - MV P1/2T: 46.2 MSEC
BH CV ECHO MEAS - MV V2 VTI: 49.5 CM
BH CV ECHO MEAS - MVA(P1/2T): 4.8 CM2
BH CV ECHO MEAS - MVA(VTI): 1.09 CM2
BH CV ECHO MEAS - PA ACC TIME: 0.07 SEC
BH CV ECHO MEAS - PA V2 MAX: 73.3 CM/SEC
BH CV ECHO MEAS - RAP SYSTOLE: 10 MMHG
BH CV ECHO MEAS - RVSP: 64.5 MMHG
BH CV ECHO MEAS - SV(LVOT): 54.2 ML
BH CV ECHO MEAS - SV(MOD-SP4): 42.6 ML
BH CV ECHO MEAS - SVI(LVOT): 28.4 ML/M2
BH CV ECHO MEAS - SVI(MOD-SP4): 22.4 ML/M2
BH CV ECHO MEAS - TAPSE (>1.6): 2.03 CM
BH CV ECHO MEAS - TR MAX PG: 54.5 MMHG
BH CV ECHO MEAS - TR MAX VEL: 369 CM/SEC
BH CV ECHO MEASUREMENTS AVERAGE E/E' RATIO: 22.48
BH CV NUCLEAR PRIOR STUDY: 3
BH CV REST NUCLEAR ISOTOPE DOSE: 10.3 MCI
BH CV STRESS BP STAGE 1: NORMAL
BH CV STRESS COMMENTS STAGE 1: NORMAL
BH CV STRESS DOSE REGADENOSON STAGE 1: 0.4
BH CV STRESS DURATION MIN STAGE 1: 0
BH CV STRESS DURATION SEC STAGE 1: 10
BH CV STRESS HR STAGE 1: 81
BH CV STRESS NUCLEAR ISOTOPE DOSE: 30.6 MCI
BH CV STRESS PROTOCOL 1: NORMAL
BH CV STRESS RECOVERY BP: NORMAL MMHG
BH CV STRESS RECOVERY HR: 79 BPM
BH CV STRESS STAGE 1: 1
BH CV XLRA - RV BASE: 3.1 CM
BH CV XLRA - RV LENGTH: 6.6 CM
BH CV XLRA - RV MID: 2.9 CM
LEFT ATRIUM VOLUME INDEX: 29 ML/M2
MAXIMAL PREDICTED HEART RATE: 134 BPM
PERCENT MAX PREDICTED HR: 60.45 %
SPECT HRT GATED+EF W RNC IV: 62 %
STRESS BASELINE BP: NORMAL MMHG
STRESS BASELINE HR: 65 BPM
STRESS PERCENT HR: 71 %
STRESS POST PEAK BP: NORMAL MMHG
STRESS POST PEAK HR: 81 BPM
STRESS TARGET HR: 114 BPM

## 2025-08-28 PROCEDURE — 93017 CV STRESS TEST TRACING ONLY: CPT

## 2025-08-28 PROCEDURE — 93306 TTE W/DOPPLER COMPLETE: CPT

## 2025-08-28 PROCEDURE — A9500 TC99M SESTAMIBI: HCPCS | Performed by: INTERNAL MEDICINE

## 2025-08-28 PROCEDURE — 25010000002 REGADENOSON 0.4 MG/5ML SOLUTION: Performed by: INTERNAL MEDICINE

## 2025-08-28 PROCEDURE — 78452 HT MUSCLE IMAGE SPECT MULT: CPT

## 2025-08-28 PROCEDURE — 34310000005 TECHNETIUM SESTAMIBI: Performed by: INTERNAL MEDICINE

## 2025-08-28 RX ORDER — REGADENOSON 0.08 MG/ML
0.4 INJECTION, SOLUTION INTRAVENOUS
Status: COMPLETED | OUTPATIENT
Start: 2025-08-28 | End: 2025-08-28

## 2025-08-28 RX ADMIN — TECHNETIUM TC 99M SESTAMIBI 1 DOSE: 1 INJECTION INTRAVENOUS at 10:53

## 2025-08-28 RX ADMIN — REGADENOSON 0.4 MG: 0.08 INJECTION, SOLUTION INTRAVENOUS at 12:11

## 2025-08-28 RX ADMIN — TECHNETIUM TC 99M SESTAMIBI 1 DOSE: 1 INJECTION INTRAVENOUS at 12:11
